# Patient Record
Sex: FEMALE | Race: WHITE | NOT HISPANIC OR LATINO | Employment: OTHER | URBAN - METROPOLITAN AREA
[De-identification: names, ages, dates, MRNs, and addresses within clinical notes are randomized per-mention and may not be internally consistent; named-entity substitution may affect disease eponyms.]

---

## 2017-04-10 ENCOUNTER — HOSPITAL ENCOUNTER (OUTPATIENT)
Dept: FAMILY MEDICINE CLINIC | Facility: CLINIC | Age: 72
Setting detail: SPECIMEN
Discharge: HOME | End: 2017-04-10
Attending: NURSE PRACTITIONER | Admitting: NURSE PRACTITIONER

## 2017-04-10 LAB
ANION GAP SERPL CALC-SCNC: 14.6 MMOL/L (ref 10–20)
BUN SERPL-MCNC: 22 MG/DL (ref 8–20)
BUN/CREAT SERPL: 31.4 (ref 5.4–26.2)
CALCIUM SERPL-MCNC: 9.3 MG/DL (ref 8.9–10.3)
CHLORIDE SERPL-SCNC: 106 MMOL/L (ref 101–111)
CONV CO2: 26 MMOL/L (ref 22–32)
CREAT UR-MCNC: 0.7 MG/DL (ref 0.4–1)
GLUCOSE SERPL-MCNC: 102 MG/DL (ref 65–99)
POTASSIUM SERPL-SCNC: 4.6 MMOL/L (ref 3.6–5.1)
SODIUM SERPL-SCNC: 142 MMOL/L (ref 136–144)

## 2017-04-15 ENCOUNTER — CONVERSION ENCOUNTER (OUTPATIENT)
Dept: FAMILY MEDICINE CLINIC | Facility: CLINIC | Age: 72
End: 2017-04-15

## 2017-04-15 LAB
C3 SERPL-MCNC: 146 MG/DL (ref 90–180)
C4 SERPL-MCNC: 28 MG/DL (ref 16–47)
CONV TOTAL PROTEIN: 6.5 G/DL (ref 6.1–8.1)
CRP SERPL-MCNC: NORMAL MG/DL
CYCLIC CITRULLINATED PEPTIDE ANTIBODY: NORMAL
ERYTHROCYTE [DISTWIDTH] IN BLOOD BY AUTOMATED COUNT: 13.9 % (ref 11–15)
ERYTHROCYTE [SEDIMENTATION RATE] IN BLOOD BY WESTERGREN METHOD: 9 MM/HR
HCT VFR BLD AUTO: 39.9 % (ref 35–45)
HGB BLD-MCNC: 13.3 G/DL (ref 11.7–15.5)
MCH RBC QN AUTO: 31.5 PG (ref 27–33)
MCHC RBC AUTO-ENTMCNC: NORMAL % (ref 32–36)
MCV RBC AUTO: 94.8 FL (ref 80–100)
PLATELET # BLD AUTO: NORMAL 10*3/MM3 (ref 140–400)
PMV BLD AUTO: 8.3 FL (ref 7.5–12.5)
RBC # BLD AUTO: NORMAL 10*6/MM3 (ref 3.8–5.1)
WBC # BLD AUTO: NORMAL K/UL (ref 3.8–10.8)

## 2017-04-27 ENCOUNTER — HOSPITAL ENCOUNTER (OUTPATIENT)
Dept: MAMMOGRAPHY | Facility: HOSPITAL | Age: 72
Discharge: HOME | End: 2017-04-27
Attending: INTERNAL MEDICINE | Admitting: INTERNAL MEDICINE

## 2017-10-10 ENCOUNTER — HOSPITAL ENCOUNTER (OUTPATIENT)
Dept: FAMILY MEDICINE CLINIC | Facility: CLINIC | Age: 72
Setting detail: SPECIMEN
Discharge: HOME | End: 2017-10-10
Attending: NURSE PRACTITIONER | Admitting: NURSE PRACTITIONER

## 2017-10-10 LAB
ALBUMIN SERPL-MCNC: 3.8 G/DL (ref 3.5–4.8)
ALBUMIN/GLOB SERPL: 1.7 {RATIO} (ref 1–1.7)
ALP SERPL-CCNC: 47 IU/L (ref 32–91)
ALT SERPL-CCNC: 26 IU/L (ref 14–54)
ANION GAP SERPL CALC-SCNC: 12 MMOL/L (ref 10–20)
AST SERPL-CCNC: 23 IU/L (ref 15–41)
BILIRUB SERPL-MCNC: 0.7 MG/DL (ref 0.3–1.2)
BUN SERPL-MCNC: 23 MG/DL (ref 8–20)
BUN/CREAT SERPL: 32.9 (ref 5.4–26.2)
CALCIUM SERPL-MCNC: 9.3 MG/DL (ref 8.9–10.3)
CHLORIDE SERPL-SCNC: 103 MMOL/L (ref 101–111)
CONV CO2: 27 MMOL/L (ref 22–32)
CONV TOTAL PROTEIN: 6.1 G/DL (ref 6.1–7.9)
CREAT UR-MCNC: 0.7 MG/DL (ref 0.4–1)
GLOBULIN UR ELPH-MCNC: 2.3 G/DL (ref 2.5–3.8)
GLUCOSE SERPL-MCNC: 106 MG/DL (ref 65–99)
POTASSIUM SERPL-SCNC: 4 MMOL/L (ref 3.6–5.1)
SODIUM SERPL-SCNC: 138 MMOL/L (ref 136–144)

## 2017-11-09 ENCOUNTER — HOSPITAL ENCOUNTER (OUTPATIENT)
Dept: MAMMOGRAPHY | Facility: HOSPITAL | Age: 72
Discharge: HOME | End: 2017-11-09
Attending: NURSE PRACTITIONER | Admitting: NURSE PRACTITIONER

## 2018-05-03 ENCOUNTER — HOSPITAL ENCOUNTER (OUTPATIENT)
Dept: LAB | Facility: HOSPITAL | Age: 73
Discharge: HOME | End: 2018-05-03
Attending: NURSE PRACTITIONER | Admitting: NURSE PRACTITIONER

## 2018-05-03 ENCOUNTER — HOSPITAL ENCOUNTER (OUTPATIENT)
Dept: RHEUMATOLOGY | Facility: CLINIC | Age: 73
Discharge: HOME | End: 2018-05-03
Attending: NURSE PRACTITIONER | Admitting: NURSE PRACTITIONER

## 2018-05-03 LAB
ALBUMIN SERPL-MCNC: 3.8 G/DL (ref 3.5–4.8)
ALBUMIN/GLOB SERPL: 1.5 {RATIO} (ref 1–1.7)
ALP SERPL-CCNC: 43 IU/L (ref 32–91)
ALT SERPL-CCNC: 22 IU/L (ref 14–54)
ANION GAP SERPL CALC-SCNC: 10.7 MMOL/L (ref 10–20)
AST SERPL-CCNC: 19 IU/L (ref 15–41)
BILIRUB SERPL-MCNC: 0.6 MG/DL (ref 0.3–1.2)
BUN SERPL-MCNC: 22 MG/DL (ref 8–20)
BUN/CREAT SERPL: 31.4 (ref 5.4–26.2)
CALCIUM SERPL-MCNC: 9.1 MG/DL (ref 8.9–10.3)
CHLORIDE SERPL-SCNC: 108 MMOL/L (ref 101–111)
CONV CO2: 26 MMOL/L (ref 22–32)
CONV TOTAL PROTEIN: 6.4 G/DL (ref 6.1–7.9)
CREAT UR-MCNC: 0.7 MG/DL (ref 0.4–1)
CRP SERPL-MCNC: 0.03 MG/DL (ref 0–0.7)
ERYTHROCYTE [DISTWIDTH] IN BLOOD BY AUTOMATED COUNT: 13.1 % (ref 11.5–14.5)
ERYTHROCYTE [SEDIMENTATION RATE] IN BLOOD BY WESTERGREN METHOD: 16 MM/HR (ref 0–30)
GLOBULIN UR ELPH-MCNC: 2.6 G/DL (ref 2.5–3.8)
GLUCOSE SERPL-MCNC: 96 MG/DL (ref 65–99)
HCT VFR BLD AUTO: 39.4 % (ref 35–49)
HGB BLD-MCNC: 13.3 G/DL (ref 12–15)
MCH RBC QN AUTO: 32.1 PG (ref 26–32)
MCHC RBC AUTO-ENTMCNC: 33.7 G/DL (ref 32–36)
MCV RBC AUTO: 95.3 FL (ref 80–94)
PLATELET # BLD AUTO: 252 10*3/UL (ref 150–450)
PMV BLD AUTO: 8.1 FL (ref 7.4–10.4)
POTASSIUM SERPL-SCNC: 3.7 MMOL/L (ref 3.6–5.1)
RBC # BLD AUTO: 4.14 10*6/UL (ref 4–5.4)
SODIUM SERPL-SCNC: 141 MMOL/L (ref 136–144)
WBC # BLD AUTO: 5.1 10*3/UL (ref 4.5–11.5)

## 2018-05-04 LAB
ANA SER QL IA: NORMAL
CCP IGG ANTIBODIES: <0.4 U/ML
CHROMATIN AB SERPL-ACNC: <20 [IU]/ML (ref 0–20)

## 2018-10-04 ENCOUNTER — HOSPITAL ENCOUNTER (OUTPATIENT)
Dept: FAMILY MEDICINE CLINIC | Facility: CLINIC | Age: 73
Setting detail: SPECIMEN
Discharge: HOME | End: 2018-10-04
Attending: PHYSICIAN ASSISTANT | Admitting: PHYSICIAN ASSISTANT

## 2018-10-04 LAB
ALBUMIN SERPL-MCNC: 3.8 G/DL (ref 3.5–4.8)
ALBUMIN/GLOB SERPL: 1.4 {RATIO} (ref 1–1.7)
ALP SERPL-CCNC: 51 IU/L (ref 32–91)
ALT SERPL-CCNC: 29 IU/L (ref 14–54)
ANION GAP SERPL CALC-SCNC: 11.4 MMOL/L (ref 10–20)
AST SERPL-CCNC: 23 IU/L (ref 15–41)
BILIRUB SERPL-MCNC: 1.1 MG/DL (ref 0.3–1.2)
BUN SERPL-MCNC: 22 MG/DL (ref 8–20)
BUN/CREAT SERPL: 31.4 (ref 5.4–26.2)
CALCIUM SERPL-MCNC: 9.7 MG/DL (ref 8.9–10.3)
CHLORIDE SERPL-SCNC: 107 MMOL/L (ref 101–111)
CHOLEST SERPL-MCNC: 133 MG/DL
CHOLEST/HDLC SERPL: 2.7 {RATIO}
CONV CO2: 26 MMOL/L (ref 22–32)
CONV LDL CHOLESTEROL DIRECT: 71 MG/DL (ref 0–100)
CONV MICROALBUM.,U,RANDOM: 9 MG/L
CONV TOTAL PROTEIN: 6.5 G/DL (ref 6.1–7.9)
CREAT 24H UR-MCNC: 149.1 MG/DL
CREAT UR-MCNC: 0.7 MG/DL (ref 0.4–1)
GLOBULIN UR ELPH-MCNC: 2.7 G/DL (ref 2.5–3.8)
GLUCOSE SERPL-MCNC: 112 MG/DL (ref 65–99)
HBA1C MFR BLD: 5.5 % (ref 0–5.6)
HDLC SERPL-MCNC: 49 MG/DL
LDLC/HDLC SERPL: 1.5 {RATIO}
LIPID INTERPRETATION: ABNORMAL
MICROALBUMIN/CREAT UR: 6 UG/MG
POTASSIUM SERPL-SCNC: 4.4 MMOL/L (ref 3.6–5.1)
SODIUM SERPL-SCNC: 140 MMOL/L (ref 136–144)
TRIGL SERPL-MCNC: 161 MG/DL
VLDLC SERPL CALC-MCNC: 13.5 MG/DL

## 2018-11-12 ENCOUNTER — HOSPITAL ENCOUNTER (OUTPATIENT)
Dept: MAMMOGRAPHY | Facility: HOSPITAL | Age: 73
Discharge: HOME | End: 2018-11-12
Attending: PHYSICIAN ASSISTANT | Admitting: PHYSICIAN ASSISTANT

## 2019-05-01 ENCOUNTER — CONVERSION ENCOUNTER (OUTPATIENT)
Dept: FAMILY MEDICINE CLINIC | Facility: CLINIC | Age: 74
End: 2019-05-01

## 2019-05-01 ENCOUNTER — HOSPITAL ENCOUNTER (OUTPATIENT)
Dept: LAB | Facility: HOSPITAL | Age: 74
Discharge: HOME | End: 2019-05-01
Attending: NURSE PRACTITIONER | Admitting: NURSE PRACTITIONER

## 2019-05-07 ENCOUNTER — HOSPITAL ENCOUNTER (OUTPATIENT)
Dept: MAMMOGRAPHY | Facility: HOSPITAL | Age: 74
Discharge: HOME | End: 2019-05-07
Attending: PHYSICIAN ASSISTANT | Admitting: PHYSICIAN ASSISTANT

## 2019-06-03 VITALS
BODY MASS INDEX: 43.02 KG/M2 | DIASTOLIC BLOOD PRESSURE: 78 MMHG | WEIGHT: 252 LBS | HEART RATE: 79 BPM | SYSTOLIC BLOOD PRESSURE: 116 MMHG | HEIGHT: 64 IN

## 2019-09-17 RX ORDER — METFORMIN HYDROCHLORIDE 500 MG/1
TABLET, EXTENDED RELEASE ORAL
Qty: 180 TABLET | Refills: 1 | Status: SHIPPED | OUTPATIENT
Start: 2019-09-17 | End: 2020-03-09

## 2019-10-22 ENCOUNTER — TELEPHONE (OUTPATIENT)
Dept: FAMILY MEDICINE CLINIC | Facility: CLINIC | Age: 74
End: 2019-10-22

## 2019-10-22 DIAGNOSIS — I10 ESSENTIAL HYPERTENSION: Primary | ICD-10-CM

## 2019-10-22 DIAGNOSIS — E78.5 HYPERLIPIDEMIA, UNSPECIFIED HYPERLIPIDEMIA TYPE: ICD-10-CM

## 2019-10-22 DIAGNOSIS — E03.9 HYPOTHYROIDISM, UNSPECIFIED TYPE: ICD-10-CM

## 2019-10-22 DIAGNOSIS — E11.69 TYPE 2 DIABETES MELLITUS WITH OTHER SPECIFIED COMPLICATION, UNSPECIFIED WHETHER LONG TERM INSULIN USE (HCC): ICD-10-CM

## 2019-10-25 LAB
ALBUMIN SERPL-MCNC: 4.3 G/DL (ref 3.5–5.2)
ALBUMIN UR-MCNC: <1.2 MG/DL
ALBUMIN/GLOB SERPL: 1.7 G/DL
ALP SERPL-CCNC: 51 U/L (ref 39–117)
ALT SERPL W P-5'-P-CCNC: 26 U/L (ref 1–33)
ANION GAP SERPL CALCULATED.3IONS-SCNC: 9.1 MMOL/L (ref 5–15)
AST SERPL-CCNC: 19 U/L (ref 1–32)
BILIRUB SERPL-MCNC: 0.6 MG/DL (ref 0.2–1.2)
BUN BLD-MCNC: 18 MG/DL (ref 8–23)
BUN/CREAT SERPL: 26.1 (ref 7–25)
CALCIUM SPEC-SCNC: 9.4 MG/DL (ref 8.6–10.5)
CHLORIDE SERPL-SCNC: 104 MMOL/L (ref 98–107)
CHOLEST SERPL-MCNC: 118 MG/DL (ref 0–200)
CO2 SERPL-SCNC: 26.9 MMOL/L (ref 22–29)
CREAT BLD-MCNC: 0.69 MG/DL (ref 0.57–1)
CREAT UR-MCNC: 94 MG/DL
GFR SERPL CREATININE-BSD FRML MDRD: 84 ML/MIN/1.73
GLOBULIN UR ELPH-MCNC: 2.6 GM/DL
GLUCOSE BLD-MCNC: 113 MG/DL (ref 65–99)
HBA1C MFR BLD: 5.5 % (ref 3.5–5.6)
HDLC SERPL-MCNC: 47 MG/DL (ref 40–60)
LDLC SERPL CALC-MCNC: 40 MG/DL (ref 0–100)
LDLC/HDLC SERPL: 0.86 {RATIO}
MICROALBUMIN/CREAT UR: NORMAL MG/G{CREAT}
POTASSIUM BLD-SCNC: 4.4 MMOL/L (ref 3.5–5.2)
PROT SERPL-MCNC: 6.9 G/DL (ref 6–8.5)
SODIUM BLD-SCNC: 140 MMOL/L (ref 136–145)
TRIGL SERPL-MCNC: 153 MG/DL (ref 0–150)
TSH SERPL DL<=0.05 MIU/L-ACNC: 1.59 UIU/ML (ref 0.27–4.2)
VLDLC SERPL-MCNC: 30.6 MG/DL (ref 5–40)

## 2019-10-25 PROCEDURE — 80053 COMPREHEN METABOLIC PANEL: CPT | Performed by: PHYSICIAN ASSISTANT

## 2019-10-25 PROCEDURE — 82570 ASSAY OF URINE CREATININE: CPT | Performed by: PHYSICIAN ASSISTANT

## 2019-10-25 PROCEDURE — 80061 LIPID PANEL: CPT | Performed by: PHYSICIAN ASSISTANT

## 2019-10-25 PROCEDURE — 82043 UR ALBUMIN QUANTITATIVE: CPT | Performed by: PHYSICIAN ASSISTANT

## 2019-10-25 PROCEDURE — 84443 ASSAY THYROID STIM HORMONE: CPT | Performed by: PHYSICIAN ASSISTANT

## 2019-10-25 PROCEDURE — 83036 HEMOGLOBIN GLYCOSYLATED A1C: CPT | Performed by: PHYSICIAN ASSISTANT

## 2019-10-25 PROCEDURE — 36415 COLL VENOUS BLD VENIPUNCTURE: CPT | Performed by: PHYSICIAN ASSISTANT

## 2019-10-28 ENCOUNTER — OFFICE VISIT (OUTPATIENT)
Dept: RHEUMATOLOGY | Facility: CLINIC | Age: 74
End: 2019-10-28

## 2019-10-28 VITALS
SYSTOLIC BLOOD PRESSURE: 124 MMHG | HEIGHT: 62 IN | BODY MASS INDEX: 46.38 KG/M2 | HEART RATE: 73 BPM | DIASTOLIC BLOOD PRESSURE: 70 MMHG | WEIGHT: 252 LBS

## 2019-10-28 DIAGNOSIS — Z23 NEED FOR INFLUENZA VACCINATION: Primary | ICD-10-CM

## 2019-10-28 DIAGNOSIS — M70.61 TROCHANTERIC BURSITIS OF RIGHT HIP: ICD-10-CM

## 2019-10-28 DIAGNOSIS — M19.90 INFLAMMATORY ARTHRITIS: ICD-10-CM

## 2019-10-28 DIAGNOSIS — R76.8 ELEVATED ANTINUCLEAR ANTIBODY (ANA) LEVEL: ICD-10-CM

## 2019-10-28 PROBLEM — M70.62 TROCHANTERIC BURSITIS OF LEFT HIP: Status: ACTIVE | Noted: 2019-05-01

## 2019-10-28 PROBLEM — Z95.0 PRESENCE OF CARDIAC PACEMAKER: Status: ACTIVE | Noted: 2019-10-28

## 2019-10-28 PROBLEM — I50.9 CONGESTIVE HEART FAILURE (HCC): Status: ACTIVE | Noted: 2019-10-28

## 2019-10-28 PROBLEM — E78.5 HYPERLIPIDEMIA: Status: ACTIVE | Noted: 2019-10-28

## 2019-10-28 PROBLEM — G47.30 SLEEP APNEA: Status: ACTIVE | Noted: 2019-10-28

## 2019-10-28 PROBLEM — I10 HYPERTENSION: Status: ACTIVE | Noted: 2019-10-28

## 2019-10-28 PROBLEM — Z78.0 POSTMENOPAUSAL: Status: ACTIVE | Noted: 2019-05-02

## 2019-10-28 PROCEDURE — G0008 ADMIN INFLUENZA VIRUS VAC: HCPCS | Performed by: NURSE PRACTITIONER

## 2019-10-28 PROCEDURE — 20610 DRAIN/INJ JOINT/BURSA W/O US: CPT | Performed by: NURSE PRACTITIONER

## 2019-10-28 PROCEDURE — 99214 OFFICE O/P EST MOD 30 MIN: CPT | Performed by: NURSE PRACTITIONER

## 2019-10-28 PROCEDURE — 90653 IIV ADJUVANT VACCINE IM: CPT | Performed by: NURSE PRACTITIONER

## 2019-10-28 RX ORDER — METOPROLOL TARTRATE 50 MG/1
TABLET, FILM COATED ORAL
Refills: 0 | COMMUNITY
Start: 2019-09-16

## 2019-10-28 RX ORDER — FUROSEMIDE 20 MG/1
TABLET ORAL
COMMUNITY
Start: 2012-12-17

## 2019-10-28 RX ORDER — LATANOPROST 50 UG/ML
SOLUTION/ DROPS OPHTHALMIC
COMMUNITY
Start: 2019-05-01 | End: 2019-10-29

## 2019-10-28 RX ORDER — ASPIRIN 325 MG
TABLET ORAL
COMMUNITY
Start: 2012-12-17 | End: 2022-06-15

## 2019-10-28 RX ORDER — LATANOPROSTENE BUNOD 0.24 MG/ML
1 SOLUTION/ DROPS OPHTHALMIC
Refills: 1 | COMMUNITY
Start: 2019-10-04

## 2019-10-28 RX ORDER — LOSARTAN POTASSIUM 50 MG/1
50 TABLET ORAL 2 TIMES DAILY
Refills: 0 | COMMUNITY
Start: 2019-08-20 | End: 2020-12-18

## 2019-10-28 RX ORDER — HYDROCODONE BITARTRATE AND ACETAMINOPHEN 5; 325 MG/1; MG/1
TABLET ORAL EVERY 8 HOURS
COMMUNITY
Start: 2014-10-13 | End: 2019-10-29 | Stop reason: SDUPTHER

## 2019-10-28 RX ORDER — METHYLPREDNISOLONE ACETATE 40 MG/ML
40 INJECTION, SUSPENSION INTRA-ARTICULAR; INTRALESIONAL; INTRAMUSCULAR; SOFT TISSUE ONCE
Status: COMPLETED | OUTPATIENT
Start: 2019-10-28 | End: 2019-10-28

## 2019-10-28 RX ORDER — LANCETS 28 GAUGE
EACH MISCELLANEOUS
COMMUNITY
Start: 2013-10-15

## 2019-10-28 RX ORDER — DORZOLAMIDE HCL 20 MG/ML
SOLUTION/ DROPS OPHTHALMIC
COMMUNITY
Start: 2019-05-01 | End: 2020-12-18

## 2019-10-28 RX ORDER — ATORVASTATIN CALCIUM 40 MG/1
40 TABLET, FILM COATED ORAL DAILY
Refills: 0 | COMMUNITY
Start: 2019-08-21

## 2019-10-28 RX ORDER — PHENOL 1.4 %
AEROSOL, SPRAY (ML) MUCOUS MEMBRANE
COMMUNITY
Start: 2012-12-17 | End: 2019-10-29

## 2019-10-28 RX ORDER — CLOPIDOGREL BISULFATE 75 MG/1
75 TABLET ORAL DAILY
Refills: 0 | COMMUNITY
Start: 2019-08-20

## 2019-10-28 RX ORDER — BLOOD-GLUCOSE METER
KIT MISCELLANEOUS
COMMUNITY
Start: 2018-10-04

## 2019-10-28 RX ORDER — HYDROXYCHLOROQUINE SULFATE 200 MG/1
TABLET, FILM COATED ORAL
COMMUNITY
Start: 2019-01-01 | End: 2019-12-06 | Stop reason: SDUPTHER

## 2019-10-28 RX ADMIN — METHYLPREDNISOLONE ACETATE 40 MG: 40 INJECTION, SUSPENSION INTRA-ARTICULAR; INTRALESIONAL; INTRAMUSCULAR; SOFT TISSUE at 11:23

## 2019-10-28 NOTE — PROGRESS NOTES
Subjective   Seema Silveira is a 74 y.o. female.     History of Present Illness     Pt is a pleasant 74 y.o. female pt with positive RENAE, inflammatory arthritis, COPD, sleep apnea, diabetes. She is here for follow up today. Last seen in 5-1-19.  Had recent labs with PCP that showed A1c 5.5, normal creatinine.   She is taking plaquenil  2 tabs/d. Up to date on eye examination. She takes tylenol as needed, she takes hydrocodone for pain (when the pain is really bad). Feeling ok.    Am stiffness lasts 1 hour. her low back aches, does stretches. Seen spine in the past, and  PM for injections in the low back....did not help much. She was going to have RFA but she cannot have that due to her pacemaker.   Right hip aches, hurts to the touch. cannot tolerate lying on the hips. She did have injection to her left hip at her last office visit that did help her.  Would like rt hip injected today.    Review of systems:  Denies fever/chills. No nasal or oral lesons. Dry eyes & mouth- she claims this lidia worse at night w/ her CPAP.  + COPD and will have SOA intermittently; mostly w/ activity. energy level is  up and down.  Denies dysphagia or raynauds.   No history of inflammatory bowel disease or uveitis.  Has sleep apnea and wears CPAP. She is followed by cardiology & electrophysiologist for her pacemaker. The remainder of the review of systems reviewed and are (-).   Diabetes- PCP manages. Recent A1c 5.5  Denies history of thyroid disease.     Bone density in 5/2019 showed osteopenia.. Takes daily calcium and vitamin D. Denies any recent falls or fractures.         After risks and benefits explained to the patient, consent obtained. The right hip was cleaned and prepped with alcohol and the area was numbed with topical lidocaine spray and then injected with 40 mg of depo medrol and 1 cc of lidocaine. Pt tolerated well. Band aid applied. Injection given at 1050 by GUICHO Hooks          The following portions of the  patient's history were reviewed and updated as appropriate: allergies, current medications, past family history, past medical history, past social history, past surgical history and problem list.    Review of Systems   Constitutional:        See HPI       Objective   Physical Exam   Constitutional: She is oriented to person, place, and time. She appears well-developed and well-nourished.   HENT:   Head: Normocephalic and atraumatic.   Nose: Nose normal.   Eyes: EOM are normal. Pupils are equal, round, and reactive to light.   Cardiovascular: Normal rate and regular rhythm.   Pulmonary/Chest: Effort normal and breath sounds normal.   Musculoskeletal:   Pt has decreased ROM over the lumbar spine  She has tenderness over the right TB & right knee w/ palpation. Mild crepitus is noted over right knee.  No swelling is noted.     Neurological: She is oriented to person, place, and time.   Skin: Skin is dry. No rash noted.   Psychiatric: She has a normal mood and affect.   Vitals reviewed.        Assessment/Plan   Seema was seen today for joint pain.    Diagnoses and all orders for this visit:    Need for influenza vaccination  -     Fluad Quad >65 years    Elevated antinuclear antibody (RENAE) level  Comments:  Stable on examination. She is taking plaquenil 400 mg/d  Continue medications; add on labs today  Orders:  -     C-reactive Protein; Future  -     Cancel: C3 Complement; Future  -     C4 Complement; Future  -     Urinalysis With Culture If Indicated -; Future  -     Sedimentation Rate; Future  -     Cancel: CBC & Differential; Future  -     C3 Complement; Future  -     CBC & Differential; Future    Trochanteric bursitis of right hip  Comments:  Injection given, see note.  Orders:  -     methylPREDNISolone acetate (DEPO-medrol) injection 40 mg    Inflammatory arthritis  Comments:  Stable w/ current therapy  Labs from PCP reviewed & will add on additional laboratories        Patient Instructions   Doing well, continue  with current medications.  Discussed the importance of eye examination with plaquenil use. Pt verbalizes understanding.   Labs reviewed from PCP & will add on orders  Right TB injection given today; post injection instructions given.  RTO 5-6 months.

## 2019-10-29 ENCOUNTER — LAB (OUTPATIENT)
Dept: LAB | Facility: HOSPITAL | Age: 74
End: 2019-10-29

## 2019-10-29 ENCOUNTER — OFFICE VISIT (OUTPATIENT)
Dept: FAMILY MEDICINE CLINIC | Facility: CLINIC | Age: 74
End: 2019-10-29

## 2019-10-29 VITALS
WEIGHT: 252 LBS | HEIGHT: 62 IN | BODY MASS INDEX: 46.38 KG/M2 | SYSTOLIC BLOOD PRESSURE: 163 MMHG | HEART RATE: 73 BPM | TEMPERATURE: 98.1 F | OXYGEN SATURATION: 98 % | DIASTOLIC BLOOD PRESSURE: 81 MMHG

## 2019-10-29 DIAGNOSIS — M54.50 CHRONIC BILATERAL LOW BACK PAIN WITHOUT SCIATICA: ICD-10-CM

## 2019-10-29 DIAGNOSIS — E78.5 HYPERLIPIDEMIA, UNSPECIFIED HYPERLIPIDEMIA TYPE: ICD-10-CM

## 2019-10-29 DIAGNOSIS — I10 ESSENTIAL HYPERTENSION: ICD-10-CM

## 2019-10-29 DIAGNOSIS — E66.01 OBESITY, MORBID (HCC): ICD-10-CM

## 2019-10-29 DIAGNOSIS — R76.8 ELEVATED ANTINUCLEAR ANTIBODY (ANA) LEVEL: ICD-10-CM

## 2019-10-29 DIAGNOSIS — Z12.11 COLON CANCER SCREENING: ICD-10-CM

## 2019-10-29 DIAGNOSIS — I50.9 CONGESTIVE HEART FAILURE, UNSPECIFIED HF CHRONICITY, UNSPECIFIED HEART FAILURE TYPE (HCC): ICD-10-CM

## 2019-10-29 DIAGNOSIS — I70.213 ATHEROSCLEROSIS OF NATIVE ARTERIES OF EXTREMITIES WITH INTERMITTENT CLAUDICATION, BILATERAL LEGS (HCC): ICD-10-CM

## 2019-10-29 DIAGNOSIS — I25.10 CHRONIC CORONARY ARTERY DISEASE: ICD-10-CM

## 2019-10-29 DIAGNOSIS — Z12.31 VISIT FOR SCREENING MAMMOGRAM: ICD-10-CM

## 2019-10-29 DIAGNOSIS — I48.0 PAROXYSMAL ATRIAL FIBRILLATION (HCC): ICD-10-CM

## 2019-10-29 DIAGNOSIS — M46.1 SACROILIITIS, NOT ELSEWHERE CLASSIFIED (HCC): ICD-10-CM

## 2019-10-29 DIAGNOSIS — M19.90 OSTEOARTHRITIS, UNSPECIFIED OSTEOARTHRITIS TYPE, UNSPECIFIED SITE: ICD-10-CM

## 2019-10-29 DIAGNOSIS — Z12.31 SCREENING MAMMOGRAM, ENCOUNTER FOR: ICD-10-CM

## 2019-10-29 DIAGNOSIS — M85.80 OSTEOPENIA, UNSPECIFIED LOCATION: ICD-10-CM

## 2019-10-29 DIAGNOSIS — H40.9 GLAUCOMA, UNSPECIFIED GLAUCOMA TYPE, UNSPECIFIED LATERALITY: ICD-10-CM

## 2019-10-29 DIAGNOSIS — M19.90 INFLAMMATORY ARTHRITIS: ICD-10-CM

## 2019-10-29 DIAGNOSIS — E11.69 TYPE 2 DIABETES MELLITUS WITH OTHER SPECIFIED COMPLICATION, WITHOUT LONG-TERM CURRENT USE OF INSULIN (HCC): ICD-10-CM

## 2019-10-29 DIAGNOSIS — G89.29 CHRONIC BILATERAL LOW BACK PAIN WITHOUT SCIATICA: ICD-10-CM

## 2019-10-29 DIAGNOSIS — Z11.59 ENCOUNTER FOR HEPATITIS C SCREENING TEST FOR LOW RISK PATIENT: Primary | ICD-10-CM

## 2019-10-29 DIAGNOSIS — G47.31 PRIMARY CENTRAL SLEEP APNEA: ICD-10-CM

## 2019-10-29 DIAGNOSIS — J44.9 CHRONIC OBSTRUCTIVE PULMONARY DISEASE, UNSPECIFIED COPD TYPE (HCC): ICD-10-CM

## 2019-10-29 LAB
BACTERIA UR QL AUTO: ABNORMAL /HPF
BASOPHILS # BLD AUTO: 0.07 10*3/MM3 (ref 0–0.2)
BASOPHILS NFR BLD AUTO: 1.1 % (ref 0–1.5)
BILIRUB UR QL STRIP: NEGATIVE
C3 SERPL-MCNC: 152 MG/DL (ref 82–167)
C4 SERPL-MCNC: 27 MG/DL (ref 14–44)
CLARITY UR: CLEAR
COLOR UR: YELLOW
CRP SERPL-MCNC: 0.06 MG/DL (ref 0–0.5)
DEPRECATED RDW RBC AUTO: 41.6 FL (ref 37–54)
EOSINOPHIL # BLD AUTO: 0.18 10*3/MM3 (ref 0–0.4)
EOSINOPHIL NFR BLD AUTO: 2.9 % (ref 0.3–6.2)
ERYTHROCYTE [DISTWIDTH] IN BLOOD BY AUTOMATED COUNT: 12 % (ref 12.3–15.4)
ERYTHROCYTE [SEDIMENTATION RATE] IN BLOOD: 10 MM/HR (ref 0–30)
GLUCOSE UR STRIP-MCNC: NEGATIVE MG/DL
HCT VFR BLD AUTO: 39.6 % (ref 34–46.6)
HCV AB SER DONR QL: NORMAL
HGB BLD-MCNC: 13.5 G/DL (ref 12–15.9)
HGB UR QL STRIP.AUTO: NEGATIVE
HYALINE CASTS UR QL AUTO: ABNORMAL /LPF
IMM GRANULOCYTES # BLD AUTO: 0.02 10*3/MM3 (ref 0–0.05)
IMM GRANULOCYTES NFR BLD AUTO: 0.3 % (ref 0–0.5)
KETONES UR QL STRIP: NEGATIVE
LEUKOCYTE ESTERASE UR QL STRIP.AUTO: ABNORMAL
LYMPHOCYTES # BLD AUTO: 1.25 10*3/MM3 (ref 0.7–3.1)
LYMPHOCYTES NFR BLD AUTO: 19.8 % (ref 19.6–45.3)
MCH RBC QN AUTO: 32.8 PG (ref 26.6–33)
MCHC RBC AUTO-ENTMCNC: 34.1 G/DL (ref 31.5–35.7)
MCV RBC AUTO: 96.1 FL (ref 79–97)
MONOCYTES # BLD AUTO: 0.81 10*3/MM3 (ref 0.1–0.9)
MONOCYTES NFR BLD AUTO: 12.9 % (ref 5–12)
NEUTROPHILS # BLD AUTO: 3.97 10*3/MM3 (ref 1.7–7)
NEUTROPHILS NFR BLD AUTO: 63 % (ref 42.7–76)
NITRITE UR QL STRIP: POSITIVE
NRBC BLD AUTO-RTO: 0 /100 WBC (ref 0–0.2)
PH UR STRIP.AUTO: 8 [PH] (ref 5–8)
PLATELET # BLD AUTO: 247 10*3/MM3 (ref 140–450)
PMV BLD AUTO: 10.4 FL (ref 6–12)
PROT UR QL STRIP: NEGATIVE
RBC # BLD AUTO: 4.12 10*6/MM3 (ref 3.77–5.28)
RBC # UR: ABNORMAL /HPF
REF LAB TEST METHOD: ABNORMAL
SP GR UR STRIP: 1.03 (ref 1–1.03)
SQUAMOUS #/AREA URNS HPF: ABNORMAL /HPF
UROBILINOGEN UR QL STRIP: ABNORMAL
WBC NRBC COR # BLD: 6.3 10*3/MM3 (ref 3.4–10.8)
WBC UR QL AUTO: ABNORMAL /HPF

## 2019-10-29 PROCEDURE — 99214 OFFICE O/P EST MOD 30 MIN: CPT | Performed by: PHYSICIAN ASSISTANT

## 2019-10-29 PROCEDURE — 86160 COMPLEMENT ANTIGEN: CPT

## 2019-10-29 PROCEDURE — 85025 COMPLETE CBC W/AUTO DIFF WBC: CPT

## 2019-10-29 PROCEDURE — 36415 COLL VENOUS BLD VENIPUNCTURE: CPT

## 2019-10-29 PROCEDURE — 87186 SC STD MICRODIL/AGAR DIL: CPT

## 2019-10-29 PROCEDURE — 81001 URINALYSIS AUTO W/SCOPE: CPT

## 2019-10-29 PROCEDURE — 86803 HEPATITIS C AB TEST: CPT | Performed by: PHYSICIAN ASSISTANT

## 2019-10-29 PROCEDURE — 85652 RBC SED RATE AUTOMATED: CPT

## 2019-10-29 PROCEDURE — 86140 C-REACTIVE PROTEIN: CPT

## 2019-10-29 PROCEDURE — 87086 URINE CULTURE/COLONY COUNT: CPT

## 2019-10-29 RX ORDER — ALBUTEROL SULFATE 90 UG/1
2 AEROSOL, METERED RESPIRATORY (INHALATION) EVERY 4 HOURS PRN
Qty: 1 INHALER | Refills: 5 | Status: SHIPPED | OUTPATIENT
Start: 2019-10-29

## 2019-10-29 RX ORDER — HYDROCODONE BITARTRATE AND ACETAMINOPHEN 5; 325 MG/1; MG/1
1 TABLET ORAL EVERY 8 HOURS PRN
Qty: 30 TABLET | Refills: 0 | Status: SHIPPED | OUTPATIENT
Start: 2019-10-29 | End: 2020-07-10 | Stop reason: SDUPTHER

## 2019-10-29 NOTE — PROGRESS NOTES
Subjective  Seema Silveira is a 74 y.o. female     History of Present Illness  Patient is a 74-year-old white female here for follow-up and maintenance of her current medical conditions which include:    1.  Osteopenia: Patient is currently taking calcium 1200 mg vitamin D 800 IU once daily.    2.  Hypertension: Patient is currently taking Lasix 20 mg once daily, losartan 50 mg twice daily, and metoprolol 50 mg 1-1/2 tablets twice a day.    3.  Hyperlipidemia: Patient is currently taking atorvastatin 40 mg daily.    4.  Coronary artery disease/atrial fibrillation: Patient is currently taking Plavix 75 mg once daily, aspirin 325 mg daily.  She has a pacemaker.    5.  Chronic arthritis/low back pain: Patient is currently taking hydrocodone 5 mg as needed, she feels a prescription for 30 pills approximately once every 6 months.    6.  Diabetes mellitus type 2: Patient is currently taking metformin extended release 500 mg 2 tablets in the morning.    7.  Glaucoma: Patient is currently taking Vyzulta drops and dorzolamide drops.  She is seeing Nikhil Gurrola for this.    8.  RENAE positive: Patient is currently taking Plaquenil 200 mg twice daily, she sees rheumatology for this.    9.  Sleep apnea: Patient is currently using and benefiting from a CPAP machine nightly.    10.  Congestive heart failure: Patient is currently taking Lasix 20 mg once daily.    The following portions of the patient's history were reviewed and updated as appropriate: allergies, current medications, past family history, past medical history, past social history, past surgical history and problem list.    Review of Systems   Constitutional: Negative for fever.   HENT: Negative for sore throat.    Eyes: Negative for blurred vision.   Respiratory: Negative for cough and shortness of breath.    Cardiovascular: Negative for chest pain.   Gastrointestinal: Negative for abdominal pain and blood in stool.   Genitourinary: Negative for flank pain and  "urgency.   Musculoskeletal: Negative for joint swelling.   Neurological: Negative for syncope and confusion.   Psychiatric/Behavioral: Negative for suicidal ideas.       Objective  Physical Exam   Constitutional: She is oriented to person, place, and time. She appears well-developed and well-nourished.   HENT:   Head: Normocephalic and atraumatic.   Right Ear: External ear normal.   Left Ear: External ear normal.   Mouth/Throat: Oropharynx is clear and moist.   Eyes: EOM are normal. Pupils are equal, round, and reactive to light.   Neck: Normal range of motion. Neck supple.   Cardiovascular: Normal rate, regular rhythm and normal heart sounds.   Pulmonary/Chest: Effort normal and breath sounds normal.   Abdominal: Soft. Bowel sounds are normal.   Musculoskeletal: Normal range of motion.        Back:    Neurological: She is alert and oriented to person, place, and time.   Skin: Skin is warm and dry.   Psychiatric: She has a normal mood and affect. Her behavior is normal.       Vitals:    10/29/19 0950   BP: 163/81   BP Location: Right arm   Patient Position: Sitting   Cuff Size: Adult   Pulse: 73   Temp: 98.1 °F (36.7 °C)   TempSrc: Oral   SpO2: 98%   Weight: 114 kg (252 lb)   Height: 157.5 cm (62\")     Body mass index is 46.09 kg/m².    PHQ-9 Total Score: 10      Assessment/Plan  Diagnoses and all orders for this visit:    1. Encounter for hepatitis C screening test for low risk patient (Primary)  Comments:  Hepatitis C screening due to patient's age.  Orders:  -     Hepatitis C antibody    2. Screening mammogram, encounter for  Comments:  Mammogram ordered  Orders:  -     Mammo Screening Digital Tomosynthesis Bilateral With CAD    3. Colon cancer screening  Comments:  Colonoscopy ordered.  Orders:  -     Ambulatory Referral For Screening Colonoscopy    4. Type 2 diabetes mellitus with other specified complication, without long-term current use of insulin (CMS/HCC)  Comments:  Stable, patient to continue current " medications.  Recent A1c 5.5.    5. Chronic obstructive pulmonary disease, unspecified COPD type (CMS/[facility])  Comments:  Starting patient on albuterol inhaler as needed and Anoro daily.    6. Primary central sleep apnea  Comments:  Patient to continue using CPAP machine.    7. Osteopenia, unspecified location  Comments:  Patient to continue with calcium and vitamin D.  She is to add on weightbearing exercises.    8. Inflammatory arthritis  Comments:  Stable, patient to continue current medications.    9. Osteoarthritis, unspecified osteoarthritis type, unspecified site  Comments:  Stable, patient to continue current medications.    10. Visit for screening mammogram  Comments:  Mammogram ordered.    11. Glaucoma, unspecified glaucoma type, unspecified laterality  Comments:  Stable, patient to continue current medications.    12. Chronic bilateral low back pain without sciatica  Comments:  I refilled the toe.  INSPECT run and reviewed.  Pain scale collected.  Narcotics contract collected.    13. Paroxysmal atrial fibrillation (CMS/[facility])  Comments:  Stable, patient to continue current medications.    14. Essential hypertension  Comments:  Stable, patient to continue current medications.  Patient reports her blood pressure is normal at home.    15. Hyperlipidemia, unspecified hyperlipidemia type  Comments:  Stable, patient to continue current medications.    16. Congestive heart failure, unspecified HF chronicity, unspecified heart failure type (CMS/[facility])  Comments:  Stable, patient to continue current medications.    17. Chronic coronary artery disease  Comments:  Stable, patient to continue current medications.    18. Sacroiliitis, not elsewhere classified (CMS/[facility])  Comments:  Stable, patient to continue current medications.    19. Obesity, morbid (CMS/[facility])  Comments:  I encouraged regular walking to help lose weight.    20. Atherosclerosis of native arteries of extremities with intermittent claudication, bilateral legs  (CMS/[facility])  Comments:  I encouraged walking and weight loss.    Other orders  -     Calcium Carb-Cholecalciferol (CALCIUM-VITAMIN D) 600-400 MG-UNIT tablet; Take 2 tablets by mouth Daily.  Dispense: 180 tablet; Refill: 1  -     HYDROcodone-acetaminophen (NORCO) 5-325 MG per tablet; Take 1 tablet by mouth Every 8 (Eight) Hours As Needed for Moderate Pain  or Severe Pain .  Dispense: 30 tablet; Refill: 0  -     albuterol sulfate  (90 Base) MCG/ACT inhaler; Inhale 2 puffs Every 4 (Four) Hours As Needed for Wheezing.  Dispense: 1 inhaler; Refill: 5  -     umeclidinium-vilanterol (ANORO ELLIPTA) 62.5-25 MCG/INH aerosol powder  inhaler; Inhale 1 puff Daily.  Dispense: 1 each; Refill: 5

## 2019-10-31 LAB — BACTERIA SPEC AEROBE CULT: ABNORMAL

## 2019-11-04 ENCOUNTER — TELEPHONE (OUTPATIENT)
Dept: RHEUMATOLOGY | Facility: CLINIC | Age: 74
End: 2019-11-04

## 2019-11-04 RX ORDER — NITROFURANTOIN 25; 75 MG/1; MG/1
100 CAPSULE ORAL 2 TIMES DAILY
Qty: 10 CAPSULE | Refills: 0 | Status: SHIPPED | OUTPATIENT
Start: 2019-11-04 | End: 2020-07-10

## 2019-11-04 NOTE — TELEPHONE ENCOUNTER
----- Message from DAVID Guzmán sent at 11/3/2019  8:49 PM EST -----  CRP,ESR, complements normal. Her urine culture came back (+) for bacterial growth; please send in nitrofurantoin 100 mg ER 1 tab po q12h X 5 days. Qty #10 NR. Push fluids, brien water. Follow up w/ PCP.

## 2019-11-13 ENCOUNTER — OFFICE VISIT (OUTPATIENT)
Dept: FAMILY MEDICINE CLINIC | Facility: CLINIC | Age: 74
End: 2019-11-13

## 2019-11-13 VITALS
SYSTOLIC BLOOD PRESSURE: 152 MMHG | DIASTOLIC BLOOD PRESSURE: 75 MMHG | OXYGEN SATURATION: 98 % | TEMPERATURE: 98 F | HEART RATE: 83 BPM | BODY MASS INDEX: 46.64 KG/M2 | WEIGHT: 255 LBS

## 2019-11-13 DIAGNOSIS — N39.0 URINARY TRACT INFECTION WITHOUT HEMATURIA, SITE UNSPECIFIED: Primary | ICD-10-CM

## 2019-11-13 LAB
BILIRUB BLD-MCNC: NEGATIVE MG/DL
CLARITY, POC: CLEAR
COLOR UR: YELLOW
GLUCOSE UR STRIP-MCNC: NEGATIVE MG/DL
KETONES UR QL: NEGATIVE
LEUKOCYTE EST, POC: ABNORMAL
NITRITE UR-MCNC: NEGATIVE MG/ML
PH UR: 5 [PH] (ref 5–8)
PROT UR STRIP-MCNC: NEGATIVE MG/DL
RBC # UR STRIP: NEGATIVE /UL
SP GR UR: 1.02 (ref 1–1.03)
UROBILINOGEN UR QL: NORMAL

## 2019-11-13 PROCEDURE — 81003 URINALYSIS AUTO W/O SCOPE: CPT | Performed by: NURSE PRACTITIONER

## 2019-11-13 PROCEDURE — 99213 OFFICE O/P EST LOW 20 MIN: CPT | Performed by: NURSE PRACTITIONER

## 2019-11-13 NOTE — PROGRESS NOTES
Subjective   Seema Silveira is a 74 y.o. female.       HPI   Pt. Seen at Rheumatology over a week ago; urine culture showed bacteria growth.  She was started on Macrobid and completed this 2 days ago.  She was told she should follow up with her PCP to make sure it had resolved.  She denies any abdominal pain, pressure or burning with urination.  No fevers.  No blood seen in urine.  She has chronic back pain from arthritis but this has not worsened.      The following portions of the patient's history were reviewed and updated as appropriate: allergies, current medications, past family history, past medical history, past social history, past surgical history and problem list.    Review of Systems   Constitutional: Negative for activity change, appetite change, chills, diaphoresis, fatigue, fever, unexpected weight gain and unexpected weight loss.   Respiratory: Negative for shortness of breath and wheezing.    Cardiovascular: Negative for chest pain and palpitations.   Gastrointestinal: Negative for abdominal pain, constipation, diarrhea, nausea and vomiting.   Genitourinary: Negative for decreased urine volume, difficulty urinating, dysuria, flank pain, frequency, hematuria, pelvic pain, pelvic pressure and urgency.   Psychiatric/Behavioral: Negative for depressed mood. The patient is not nervous/anxious.        Objective   Physical Exam   Constitutional: She appears well-developed and well-nourished. No distress.   Cardiovascular: Normal rate, regular rhythm and normal heart sounds.   No murmur heard.  Pulmonary/Chest: Effort normal and breath sounds normal. No respiratory distress.   Abdominal: Soft. Bowel sounds are normal. She exhibits no distension. There is no tenderness.   Psychiatric: She has a normal mood and affect.   Vitals reviewed.        Assessment/Plan   Diagnoses and all orders for this visit:    Urinary tract infection without hematuria, site unspecified  Comments:  Resolved.  UA today is  normal.     Abx. completed.    Call for any concerns.

## 2019-11-14 ENCOUNTER — HOSPITAL ENCOUNTER (OUTPATIENT)
Dept: MAMMOGRAPHY | Facility: HOSPITAL | Age: 74
Discharge: HOME | End: 2019-11-14
Admitting: PHYSICIAN ASSISTANT

## 2019-11-14 PROCEDURE — 77063 BREAST TOMOSYNTHESIS BI: CPT

## 2019-11-14 PROCEDURE — 77067 SCR MAMMO BI INCL CAD: CPT

## 2019-12-09 RX ORDER — HYDROXYCHLOROQUINE SULFATE 200 MG/1
TABLET, FILM COATED ORAL
Qty: 180 TABLET | Refills: 1 | Status: SHIPPED | OUTPATIENT
Start: 2019-12-09 | End: 2020-10-13 | Stop reason: SDUPTHER

## 2020-03-09 RX ORDER — METFORMIN HYDROCHLORIDE 500 MG/1
TABLET, EXTENDED RELEASE ORAL
Qty: 180 TABLET | Refills: 1 | Status: SHIPPED | OUTPATIENT
Start: 2020-03-09 | End: 2020-09-08

## 2020-06-30 ENCOUNTER — TELEPHONE (OUTPATIENT)
Dept: FAMILY MEDICINE CLINIC | Facility: CLINIC | Age: 75
End: 2020-06-30

## 2020-06-30 DIAGNOSIS — I10 ESSENTIAL HYPERTENSION: ICD-10-CM

## 2020-06-30 DIAGNOSIS — E78.2 MIXED HYPERLIPIDEMIA: Primary | ICD-10-CM

## 2020-06-30 DIAGNOSIS — E11.69 TYPE 2 DIABETES MELLITUS WITH OTHER SPECIFIED COMPLICATION, WITHOUT LONG-TERM CURRENT USE OF INSULIN (HCC): ICD-10-CM

## 2020-06-30 NOTE — TELEPHONE ENCOUNTER
Pt has an appt on 7/10/20 pt inquiring about labwork prior to the appt if so pt would like to have it completed.

## 2020-07-06 ENCOUNTER — LAB (OUTPATIENT)
Dept: FAMILY MEDICINE CLINIC | Facility: CLINIC | Age: 75
End: 2020-07-06

## 2020-07-06 LAB
ALBUMIN SERPL-MCNC: 4.1 G/DL (ref 3.5–5.2)
ALBUMIN UR-MCNC: <1.2 MG/DL
ALBUMIN/GLOB SERPL: 1.9 G/DL
ALP SERPL-CCNC: 45 U/L (ref 39–117)
ALT SERPL W P-5'-P-CCNC: 24 U/L (ref 1–33)
ANION GAP SERPL CALCULATED.3IONS-SCNC: 8.8 MMOL/L (ref 5–15)
AST SERPL-CCNC: 20 U/L (ref 1–32)
BILIRUB SERPL-MCNC: 1 MG/DL (ref 0–1.2)
BUN SERPL-MCNC: 20 MG/DL (ref 8–23)
BUN/CREAT SERPL: 26.3 (ref 7–25)
CALCIUM SPEC-SCNC: 9.6 MG/DL (ref 8.6–10.5)
CHLORIDE SERPL-SCNC: 105 MMOL/L (ref 98–107)
CHOLEST SERPL-MCNC: 113 MG/DL (ref 0–200)
CO2 SERPL-SCNC: 27.2 MMOL/L (ref 22–29)
CREAT SERPL-MCNC: 0.76 MG/DL (ref 0.57–1)
CREAT UR-MCNC: 94.6 MG/DL
GFR SERPL CREATININE-BSD FRML MDRD: 75 ML/MIN/1.73
GLOBULIN UR ELPH-MCNC: 2.2 GM/DL
GLUCOSE SERPL-MCNC: 95 MG/DL (ref 65–99)
HBA1C MFR BLD: 5.4 % (ref 3.5–5.6)
HDLC SERPL-MCNC: 51 MG/DL (ref 40–60)
LDLC SERPL CALC-MCNC: 37 MG/DL (ref 0–100)
LDLC/HDLC SERPL: 0.72 {RATIO}
MICROALBUMIN/CREAT UR: NORMAL MG/G{CREAT}
POTASSIUM SERPL-SCNC: 4.4 MMOL/L (ref 3.5–5.2)
PROT SERPL-MCNC: 6.3 G/DL (ref 6–8.5)
SODIUM SERPL-SCNC: 141 MMOL/L (ref 136–145)
TRIGL SERPL-MCNC: 127 MG/DL (ref 0–150)
VLDLC SERPL-MCNC: 25.4 MG/DL (ref 5–40)

## 2020-07-06 PROCEDURE — 82570 ASSAY OF URINE CREATININE: CPT | Performed by: PHYSICIAN ASSISTANT

## 2020-07-06 PROCEDURE — 83036 HEMOGLOBIN GLYCOSYLATED A1C: CPT | Performed by: PHYSICIAN ASSISTANT

## 2020-07-06 PROCEDURE — 80061 LIPID PANEL: CPT | Performed by: PHYSICIAN ASSISTANT

## 2020-07-06 PROCEDURE — 80053 COMPREHEN METABOLIC PANEL: CPT | Performed by: PHYSICIAN ASSISTANT

## 2020-07-06 PROCEDURE — 82043 UR ALBUMIN QUANTITATIVE: CPT | Performed by: PHYSICIAN ASSISTANT

## 2020-07-06 PROCEDURE — 36415 COLL VENOUS BLD VENIPUNCTURE: CPT | Performed by: PHYSICIAN ASSISTANT

## 2020-07-10 ENCOUNTER — OFFICE VISIT (OUTPATIENT)
Dept: FAMILY MEDICINE CLINIC | Facility: CLINIC | Age: 75
End: 2020-07-10

## 2020-07-10 VITALS
OXYGEN SATURATION: 97 % | SYSTOLIC BLOOD PRESSURE: 136 MMHG | BODY MASS INDEX: 44.72 KG/M2 | WEIGHT: 243 LBS | HEART RATE: 77 BPM | HEIGHT: 62 IN | TEMPERATURE: 97.3 F | DIASTOLIC BLOOD PRESSURE: 77 MMHG

## 2020-07-10 DIAGNOSIS — M54.50 CHRONIC BILATERAL LOW BACK PAIN WITHOUT SCIATICA: ICD-10-CM

## 2020-07-10 DIAGNOSIS — Z00.00 MEDICARE ANNUAL WELLNESS VISIT, SUBSEQUENT: Primary | ICD-10-CM

## 2020-07-10 DIAGNOSIS — G89.29 CHRONIC BILATERAL LOW BACK PAIN WITHOUT SCIATICA: ICD-10-CM

## 2020-07-10 PROCEDURE — G0439 PPPS, SUBSEQ VISIT: HCPCS | Performed by: PHYSICIAN ASSISTANT

## 2020-07-10 RX ORDER — RAMIPRIL 10 MG/1
10 CAPSULE ORAL DAILY
COMMUNITY
Start: 2020-06-05

## 2020-07-10 RX ORDER — LORATADINE 10 MG/1
10 TABLET ORAL DAILY
Qty: 90 TABLET | Refills: 1 | Status: SHIPPED | OUTPATIENT
Start: 2020-07-10 | End: 2021-06-22

## 2020-07-10 RX ORDER — HYDROCODONE BITARTRATE AND ACETAMINOPHEN 5; 325 MG/1; MG/1
1 TABLET ORAL EVERY 8 HOURS PRN
Qty: 30 TABLET | Refills: 0 | Status: SHIPPED | OUTPATIENT
Start: 2020-07-10 | End: 2020-12-18 | Stop reason: SDUPTHER

## 2020-07-10 NOTE — PROGRESS NOTES
The ABCs of the Annual Wellness Visit  Subsequent Medicare Wellness Visit    Chief Complaint   Patient presents with   • Medicare Wellness-subsequent       Subjective   History of Present Illness:  Seema Silveira is a 74 y.o. female who presents for a Subsequent Medicare Wellness Visit.    HEALTH RISK ASSESSMENT    Recent Hospitalizations:  No hospitalization(s) within the last year.    Current Medical Providers:  Patient Care Team:  Stephanie Orlando PA as PCP - General  Stephanie Orlando PA as PCP - Family Medicine    Smoking Status:  Social History     Tobacco Use   Smoking Status Never Smoker   Smokeless Tobacco Never Used       Alcohol Consumption:  Social History     Substance and Sexual Activity   Alcohol Use No   • Frequency: Never       Depression Screen:   PHQ-2/PHQ-9 Depression Screening 7/10/2020   Little interest or pleasure in doing things 1   Feeling down, depressed, or hopeless 1   Trouble falling or staying asleep, or sleeping too much 1   Feeling tired or having little energy 1   Poor appetite or overeating 1   Feeling bad about yourself - or that you are a failure or have let yourself or your family down 0   Trouble concentrating on things, such as reading the newspaper or watching television 0   Moving or speaking so slowly that other people could have noticed. Or the opposite - being so fidgety or restless that you have been moving around a lot more than usual 0   Thoughts that you would be better off dead, or of hurting yourself in some way 0   Total Score 5   If you checked off any problems, how difficult have these problems made it for you to do your work, take care of things at home, or get along with other people? Somewhat difficult       Fall Risk Screen:  STEADI Fall Risk Assessment was completed, and patient is at MODERATE risk for falls. Assessment completed on:7/10/2020    Health Habits and Functional and Cognitive Screening:  Functional & Cognitive Status 7/10/2020   Do you have  difficulty preparing food and eating? No   Do you have difficulty bathing yourself, getting dressed or grooming yourself? No   Do you have difficulty using the toilet? No   Do you have difficulty moving around from place to place? No   Do you have trouble with steps or getting out of a bed or a chair? No   Current Diet Well Balanced Diet   Dental Exam Up to date   Eye Exam Up to date   Exercise (times per week) 2 times per week   Current Exercise Activities Include Walking   Do you need help using the phone?  No   Are you deaf or do you have serious difficulty hearing?  Yes   Do you need help with transportation? No   Do you need help shopping? No   Do you need help preparing meals?  No   Do you need help with housework?  No   Do you need help with laundry? No   Do you need help taking your medications? No   Do you need help managing money? No   Do you ever drive or ride in a car without wearing a seat belt? No   Have you felt unusual stress, anger or loneliness in the last month? Yes   Who do you live with? Alone   If you need help, do you have trouble finding someone available to you? No   Have you been bothered in the last four weeks by sexual problems? No   Do you have difficulty concentrating, remembering or making decisions? No         Does the patient have evidence of cognitive impairment? No    Asprin use counseling:Taking ASA appropriately as indicated    Age-appropriate Screening Schedule:  Refer to the list below for future screening recommendations based on patient's age, sex and/or medical conditions. Orders for these recommended tests are listed in the plan section. The patient has been provided with a written plan.    Health Maintenance   Topic Date Due   • TDAP/TD VACCINES (1 - Tdap) 09/30/1956   • ZOSTER VACCINE (1 of 2) 09/30/1997   • DIABETIC FOOT EXAM  09/11/2019   • DIABETIC EYE EXAM  09/11/2019   • COLONOSCOPY  09/11/2019   • INFLUENZA VACCINE  08/01/2020   • HEMOGLOBIN A1C  01/06/2021   • DXA  SCAN  05/11/2021   • LIPID PANEL  07/06/2021   • URINE MICROALBUMIN  07/06/2021   • MAMMOGRAM  11/14/2021          The following portions of the patient's history were reviewed and updated as appropriate: allergies, current medications, past family history, past medical history, past social history, past surgical history and problem list.    Outpatient Medications Prior to Visit   Medication Sig Dispense Refill   • albuterol sulfate  (90 Base) MCG/ACT inhaler Inhale 2 puffs Every 4 (Four) Hours As Needed for Wheezing. 1 inhaler 5   • aspirin 325 MG tablet ASPIRIN 325 MG TABS     • atorvastatin (LIPITOR) 40 MG tablet Take 40 mg by mouth Daily.  0   • Blood Glucose Monitoring Suppl (FREESTYLE FREEDOM LITE) w/Device kit FREESTYLE [place] [employer] LITE w/Device KIT     • Calcium Carb-Cholecalciferol (CALCIUM-VITAMIN D) 600-400 MG-UNIT tablet Take 2 tablets by mouth Daily. 180 tablet 1   • clopidogrel (PLAVIX) 75 MG tablet Take 75 mg by mouth Daily.  0   • dorzolamide (TRUSOPT) 2 % ophthalmic solution DORZOLAMIDE HCL 2 % SOLN     • furosemide (LASIX) 20 MG tablet FUROSEMIDE 20 MG TABS     • hydroxychloroquine (PLAQUENIL) 200 MG tablet TAKE 1 TABLET BY MOUTH TWICE A DAY AFTER MEALS 180 tablet 1   • Lancets (FREESTYLE) lancets FREESTYLE LANCETS     • losartan (COZAAR) 50 MG tablet Take 50 mg by mouth 2 (Two) Times a Day.  0   • metFORMIN ER (GLUCOPHAGE-XR) 500 MG 24 hr tablet TAKE 2 TABLETS BY MOUTH WITH A MEAL EVERY  tablet 1   • metoprolol tartrate (LOPRESSOR) 50 MG tablet TAKE 1 AND 1/2 TABLET BY MOUTH TWICE A DAY AS DIRECTED  0   • ramipril (ALTACE) 10 MG capsule Take 10 mg by mouth Daily.     • umeclidinium-vilanterol (ANORO ELLIPTA) 62.5-25 MCG/INH aerosol powder  inhaler Inhale 1 puff Daily. 1 each 5   • VYZULTA 0.024 % solution Administer 1 drop to both eyes every night at bedtime.  1   • HYDROcodone-acetaminophen (NORCO) 5-325 MG per tablet Take 1 tablet by mouth Every 8 (Eight) Hours As Needed for Moderate  Pain  or Severe Pain . 30 tablet 0   • nitrofurantoin, macrocrystal-monohydrate, (MACROBID) 100 MG capsule Take 1 capsule by mouth 2 (Two) Times a Day. 10 capsule 0     No facility-administered medications prior to visit.        Patient Active Problem List   Diagnosis   • Atherosclerosis of native arteries of extremities with intermittent claudication, bilateral legs (CMS/HCC)   • Chronic coronary artery disease   • Chronic low back pain   • Chronic obstructive pulmonary disease (CMS/HCC)   • Congestive heart failure (CMS/HCC)   • Constipation, chronic   • Degeneration of intervertebral disc of lumbar region   • Elevated antinuclear antibody (RENAE) level   • Elevation of level of transaminase or lactic acid dehydrogenase (LDH)   • Encounter for general adult medical examination without abnormal findings   • Visit for screening mammogram   • Gastroesophageal reflux disease   • Glaucoma   • Hyperlipidemia   • Hypertension   • Lipoma   • Lumbar radiculopathy, right   • Muscle ache   • Need for other prophylactic vaccination and inoculation against single diseases   • Obesity, morbid (CMS/HCC)   • Inflammatory arthritis   • Osteoarthritis   • Osteopenia   • Pain in joint of left shoulder   • Paroxysmal atrial fibrillation (CMS/HCC)   • Postmenopausal   • Presence of cardiac pacemaker   • Restless leg syndrome   • Sacroiliitis, not elsewhere classified (CMS/HCC)   • Sick sinus syndrome (CMS/HCC)   • Sleep apnea   • Spondylosis of lumbosacral region   • Status post percutaneous transluminal coronary angioplasty   • Trochanteric bursitis of left hip   • Type 2 diabetes mellitus (CMS/HCC)       Advanced Care Planning:  ACP discussion was declined by the patient. Patient has an advance directive (not in EMR), copy requested.    Review of Systems   Constitutional: Negative for fatigue and fever.   Respiratory: Negative for shortness of breath.    Cardiovascular: Negative for chest pain.   Gastrointestinal: Negative for  "diarrhea, nausea and vomiting.   Musculoskeletal: Positive for back pain.   Neurological: Negative for dizziness.       Compared to one year ago, the patient feels her physical health is worse.  Compared to one year ago, the patient feels her mental health is worse.    Reviewed chart for potential of high risk medication in the elderly: yes  Reviewed chart for potential of harmful drug interactions in the elderly:yes    Objective         Vitals:    07/10/20 0909 07/10/20 0956   BP: 160/78 136/77   BP Location: Left arm    Patient Position: Sitting    Cuff Size: Large Adult    Pulse: 77    Temp: 97.3 °F (36.3 °C)    TempSrc: Temporal    SpO2: 97%    Weight: 110 kg (243 lb)    Height: 157.5 cm (62\")        Body mass index is 44.45 kg/m².  Discussed the patient's BMI with her. The BMI is above average; no BMI management plan is appropriate..    Physical Exam    Lab Results   Component Value Date    TRIG 127 2020    HDL 51 2020    LDL 37 2020    VLDL 25.4 2020    HGBA1C 5.4 2020        Assessment/Plan   Medicare Risks and Personalized Health Plan  CMS Preventative Services Quick Reference  Breast Cancer/Mammogram Screening  Colon Cancer Screening  Inactivity/Sedentary  Osteoprorosis Risk    The above risks/problems have been discussed with the patient.  Pertinent information has been shared with the patient in the After Visit Summary.  Follow up plans and orders are seen below in the Assessment/Plan Section.    Diagnoses and all orders for this visit:    1. Medicare annual wellness visit, subsequent (Primary)  Comments:  Preventative screenings up-to-date, labs month within normal limits.  Patient's   in May 2020 of heart attack/stroke, she has been feeling sad and tearful since that time, however she denies feelings of suicidal ideation/homicidal ideation.  She declined medication for depression.  She has family that lives close by, her grandson comes over and helps her mow the " yard.    2. Chronic bilateral low back pain without sciatica  Comments:  Stable, patient to continue current medications.  Pain scale collected.  Orders:  -     HYDROcodone-acetaminophen (Norco) 5-325 MG per tablet; Take 1 tablet by mouth Every 8 (Eight) Hours As Needed for Moderate Pain  or Severe Pain .  Dispense: 30 tablet; Refill: 0    Other orders  -     loratadine (CLARITIN) 10 MG tablet; Take 1 tablet by mouth Daily.  Dispense: 90 tablet; Refill: 1      Follow Up:  Return in about 6 months (around 1/10/2021).     An After Visit Summary and PPPS were given to the patient.

## 2020-09-02 ENCOUNTER — OFFICE VISIT (OUTPATIENT)
Dept: FAMILY MEDICINE CLINIC | Facility: CLINIC | Age: 75
End: 2020-09-02

## 2020-09-02 VITALS
BODY MASS INDEX: 43.43 KG/M2 | HEIGHT: 62 IN | SYSTOLIC BLOOD PRESSURE: 146 MMHG | HEART RATE: 84 BPM | TEMPERATURE: 96.8 F | OXYGEN SATURATION: 99 % | WEIGHT: 236 LBS | DIASTOLIC BLOOD PRESSURE: 84 MMHG

## 2020-09-02 DIAGNOSIS — R30.0 DYSURIA: Primary | ICD-10-CM

## 2020-09-02 LAB
BILIRUB BLD-MCNC: NEGATIVE MG/DL
CLARITY, POC: CLEAR
COLOR UR: YELLOW
GLUCOSE UR STRIP-MCNC: NEGATIVE MG/DL
KETONES UR QL: NEGATIVE
LEUKOCYTE EST, POC: ABNORMAL
NITRITE UR-MCNC: POSITIVE MG/ML
PH UR: 5 [PH] (ref 5–8)
PROT UR STRIP-MCNC: NEGATIVE MG/DL
RBC # UR STRIP: NEGATIVE /UL
SP GR UR: 1.02 (ref 1–1.03)
UROBILINOGEN UR QL: NORMAL

## 2020-09-02 PROCEDURE — 87077 CULTURE AEROBIC IDENTIFY: CPT | Performed by: NURSE PRACTITIONER

## 2020-09-02 PROCEDURE — 87086 URINE CULTURE/COLONY COUNT: CPT | Performed by: NURSE PRACTITIONER

## 2020-09-02 PROCEDURE — 99213 OFFICE O/P EST LOW 20 MIN: CPT | Performed by: NURSE PRACTITIONER

## 2020-09-02 PROCEDURE — 81003 URINALYSIS AUTO W/O SCOPE: CPT | Performed by: NURSE PRACTITIONER

## 2020-09-02 PROCEDURE — 87186 SC STD MICRODIL/AGAR DIL: CPT | Performed by: NURSE PRACTITIONER

## 2020-09-02 RX ORDER — CEPHALEXIN 500 MG/1
500 CAPSULE ORAL 2 TIMES DAILY
Qty: 14 CAPSULE | Refills: 0 | Status: SHIPPED | OUTPATIENT
Start: 2020-09-02 | End: 2020-09-09

## 2020-09-02 RX ORDER — GABAPENTIN 300 MG/1
300 CAPSULE ORAL
COMMUNITY
Start: 2020-08-24 | End: 2020-12-18

## 2020-09-02 NOTE — PROGRESS NOTES
Subjective   Seema Silveira is a 74 y.o. female.       HPI   Pt. Is here today with concern of a UTI.  Her symptoms started 3-4 days ago.    She is having burning with urination; frequency and urgency.  She has not seen blood in the urine; denies any fevers. Has a mild pelvic pressure.  Denies any CP; palpitations; SOA: dizziness; headache; trouble with vision.  She has increased fluids.     The following portions of the patient's history were reviewed and updated as appropriate: allergies, current medications, past family history, past medical history, past social history, past surgical history and problem list.    Review of Systems   Constitutional: Negative for activity change, appetite change, chills, diaphoresis, fatigue, fever, unexpected weight gain and unexpected weight loss.   Respiratory: Negative for cough, chest tightness, shortness of breath and wheezing.    Cardiovascular: Negative for chest pain, palpitations and leg swelling.   Gastrointestinal: Negative for abdominal distention, abdominal pain, blood in stool, constipation, diarrhea, nausea, vomiting and indigestion.   Genitourinary: Positive for dysuria, frequency and urgency. Negative for decreased urine volume, flank pain, hematuria, pelvic pain, pelvic pressure, urinary incontinence, vaginal bleeding and vaginal discharge.   Musculoskeletal: Negative for arthralgias, back pain and myalgias.   Skin: Negative for rash.   Neurological: Negative for dizziness, weakness, light-headedness, headache and confusion.   Psychiatric/Behavioral: Negative for depressed mood. The patient is not nervous/anxious.        Objective   Physical Exam   Constitutional: She is oriented to person, place, and time. She appears well-developed and well-nourished. No distress.   Cardiovascular: Normal rate, regular rhythm, normal heart sounds and intact distal pulses.   No murmur heard.  Pulmonary/Chest: Effort normal and breath sounds normal. No respiratory distress.  She has no wheezes. She exhibits no tenderness.   Abdominal: Soft. Bowel sounds are normal. She exhibits no distension and no mass. There is tenderness (mild suprapubic tenderness). There is no rebound and no guarding.   Neurological: She is alert and oriented to person, place, and time.   Skin: Skin is warm and dry. Capillary refill takes less than 2 seconds. No erythema.   Psychiatric: She has a normal mood and affect.   Vitals reviewed.        Assessment/Plan   Seema was seen today for urinary tract infection.    Diagnoses and all orders for this visit:    Dysuria  Comments:  UA shows small leuks with positive nitrites.  Sent for cx.   Will start Keflex.    Increase fluids   Call for worsening.   Orders:  -     POC Urinalysis Dipstick, Automated  -     Urine Culture - Urine, Urine, Clean Catch  -     cephalexin (Keflex) 500 MG capsule; Take 1 capsule by mouth 2 (Two) Times a Day for 7 days.

## 2020-09-04 LAB — BACTERIA SPEC AEROBE CULT: ABNORMAL

## 2020-09-08 RX ORDER — METFORMIN HYDROCHLORIDE 500 MG/1
TABLET, EXTENDED RELEASE ORAL
Qty: 180 TABLET | Refills: 1 | Status: SHIPPED | OUTPATIENT
Start: 2020-09-08 | End: 2021-03-26 | Stop reason: SDUPTHER

## 2020-10-13 RX ORDER — HYDROXYCHLOROQUINE SULFATE 200 MG/1
200 TABLET, FILM COATED ORAL 2 TIMES DAILY
Qty: 60 TABLET | Refills: 1 | Status: CANCELLED | OUTPATIENT
Start: 2020-10-13 | End: 2020-12-12

## 2020-10-13 RX ORDER — HYDROXYCHLOROQUINE SULFATE 200 MG/1
200 TABLET, FILM COATED ORAL 2 TIMES DAILY
Qty: 60 TABLET | Refills: 2 | Status: SHIPPED | OUTPATIENT
Start: 2020-10-13 | End: 2020-11-12

## 2020-10-13 NOTE — TELEPHONE ENCOUNTER
Pt states that you said you would refill the selected medication since she no longer has a rheumatologist.    DLS 7/10/2020

## 2020-10-29 ENCOUNTER — TRANSCRIBE ORDERS (OUTPATIENT)
Dept: ADMINISTRATIVE | Facility: HOSPITAL | Age: 75
End: 2020-10-29

## 2020-10-29 DIAGNOSIS — Z12.39 SCREENING BREAST EXAMINATION: Primary | ICD-10-CM

## 2020-11-16 ENCOUNTER — HOSPITAL ENCOUNTER (OUTPATIENT)
Dept: MAMMOGRAPHY | Facility: HOSPITAL | Age: 75
Discharge: HOME | End: 2020-11-16
Admitting: PHYSICIAN ASSISTANT

## 2020-11-16 DIAGNOSIS — Z12.39 SCREENING BREAST EXAMINATION: ICD-10-CM

## 2020-11-16 PROCEDURE — 77063 BREAST TOMOSYNTHESIS BI: CPT

## 2020-11-16 PROCEDURE — 77067 SCR MAMMO BI INCL CAD: CPT

## 2020-12-18 ENCOUNTER — LAB (OUTPATIENT)
Dept: FAMILY MEDICINE CLINIC | Facility: CLINIC | Age: 75
End: 2020-12-18

## 2020-12-18 ENCOUNTER — OFFICE VISIT (OUTPATIENT)
Dept: FAMILY MEDICINE CLINIC | Facility: CLINIC | Age: 75
End: 2020-12-18

## 2020-12-18 VITALS
TEMPERATURE: 96.8 F | WEIGHT: 235 LBS | HEIGHT: 62 IN | DIASTOLIC BLOOD PRESSURE: 82 MMHG | HEART RATE: 77 BPM | OXYGEN SATURATION: 100 % | SYSTOLIC BLOOD PRESSURE: 134 MMHG | BODY MASS INDEX: 43.24 KG/M2

## 2020-12-18 DIAGNOSIS — E11.65 TYPE 2 DIABETES MELLITUS WITH HYPERGLYCEMIA, WITHOUT LONG-TERM CURRENT USE OF INSULIN (HCC): Primary | ICD-10-CM

## 2020-12-18 DIAGNOSIS — J44.9 CHRONIC OBSTRUCTIVE PULMONARY DISEASE, UNSPECIFIED COPD TYPE (HCC): ICD-10-CM

## 2020-12-18 DIAGNOSIS — I10 ESSENTIAL HYPERTENSION: ICD-10-CM

## 2020-12-18 DIAGNOSIS — E78.5 HYPERLIPIDEMIA, UNSPECIFIED HYPERLIPIDEMIA TYPE: ICD-10-CM

## 2020-12-18 DIAGNOSIS — E11.65 TYPE 2 DIABETES MELLITUS WITH HYPERGLYCEMIA, WITHOUT LONG-TERM CURRENT USE OF INSULIN (HCC): ICD-10-CM

## 2020-12-18 DIAGNOSIS — M54.50 CHRONIC BILATERAL LOW BACK PAIN WITHOUT SCIATICA: ICD-10-CM

## 2020-12-18 DIAGNOSIS — G89.29 CHRONIC BILATERAL LOW BACK PAIN WITHOUT SCIATICA: ICD-10-CM

## 2020-12-18 LAB
ALBUMIN SERPL-MCNC: 4.5 G/DL (ref 3.5–5.2)
ALBUMIN/GLOB SERPL: 1.9 G/DL
ALP SERPL-CCNC: 56 U/L (ref 39–117)
ALT SERPL W P-5'-P-CCNC: 16 U/L (ref 1–33)
ANION GAP SERPL CALCULATED.3IONS-SCNC: 10.4 MMOL/L (ref 5–15)
AST SERPL-CCNC: 15 U/L (ref 1–32)
BILIRUB SERPL-MCNC: 0.8 MG/DL (ref 0–1.2)
BUN SERPL-MCNC: 23 MG/DL (ref 8–23)
BUN/CREAT SERPL: 35.4 (ref 7–25)
CALCIUM SPEC-SCNC: 9.6 MG/DL (ref 8.6–10.5)
CHLORIDE SERPL-SCNC: 105 MMOL/L (ref 98–107)
CHOLEST SERPL-MCNC: 128 MG/DL (ref 0–200)
CO2 SERPL-SCNC: 25.6 MMOL/L (ref 22–29)
CREAT SERPL-MCNC: 0.65 MG/DL (ref 0.57–1)
GFR SERPL CREATININE-BSD FRML MDRD: 89 ML/MIN/1.73
GLOBULIN UR ELPH-MCNC: 2.4 GM/DL
GLUCOSE SERPL-MCNC: 92 MG/DL (ref 65–99)
HBA1C MFR BLD: 5.5 % (ref 3.5–5.6)
HDLC SERPL-MCNC: 58 MG/DL (ref 40–60)
LDLC SERPL CALC-MCNC: 48 MG/DL (ref 0–100)
LDLC/HDLC SERPL: 0.78 {RATIO}
POTASSIUM SERPL-SCNC: 4.5 MMOL/L (ref 3.5–5.2)
PROT SERPL-MCNC: 6.9 G/DL (ref 6–8.5)
SODIUM SERPL-SCNC: 141 MMOL/L (ref 136–145)
TRIGL SERPL-MCNC: 124 MG/DL (ref 0–150)
VLDLC SERPL-MCNC: 22 MG/DL (ref 5–40)

## 2020-12-18 PROCEDURE — 83036 HEMOGLOBIN GLYCOSYLATED A1C: CPT | Performed by: NURSE PRACTITIONER

## 2020-12-18 PROCEDURE — 36415 COLL VENOUS BLD VENIPUNCTURE: CPT

## 2020-12-18 PROCEDURE — 80053 COMPREHEN METABOLIC PANEL: CPT | Performed by: NURSE PRACTITIONER

## 2020-12-18 PROCEDURE — 99214 OFFICE O/P EST MOD 30 MIN: CPT | Performed by: NURSE PRACTITIONER

## 2020-12-18 PROCEDURE — 80061 LIPID PANEL: CPT | Performed by: NURSE PRACTITIONER

## 2020-12-18 RX ORDER — HYDROCODONE BITARTRATE AND ACETAMINOPHEN 5; 325 MG/1; MG/1
1 TABLET ORAL EVERY 8 HOURS PRN
Qty: 30 TABLET | Refills: 0 | Status: SHIPPED | OUTPATIENT
Start: 2020-12-18 | End: 2021-12-15 | Stop reason: SDUPTHER

## 2020-12-18 RX ORDER — ISOSORBIDE MONONITRATE 30 MG/1
30 TABLET, EXTENDED RELEASE ORAL DAILY
COMMUNITY

## 2020-12-18 RX ORDER — DORZOLAMIDE HYDROCHLORIDE AND TIMOLOL MALEATE 20; 5 MG/ML; MG/ML
SOLUTION/ DROPS OPHTHALMIC
COMMUNITY
Start: 2020-10-23

## 2020-12-18 RX ORDER — NITROGLYCERIN 0.4 MG/1
TABLET SUBLINGUAL
COMMUNITY
Start: 2020-11-25

## 2020-12-18 RX ORDER — HYDROXYCHLOROQUINE SULFATE 200 MG/1
TABLET, FILM COATED ORAL
COMMUNITY
Start: 2020-12-12 | End: 2021-04-21 | Stop reason: SDUPTHER

## 2020-12-18 RX ORDER — ISOSORBIDE MONONITRATE 30 MG/1
30 TABLET, EXTENDED RELEASE ORAL DAILY
COMMUNITY
Start: 2020-11-25 | End: 2020-12-18

## 2020-12-18 NOTE — PROGRESS NOTES
Subjective   Seema Silveira is a 75 y.o. female.       HPI   Pt. Is here today for medication follow up.   1) T2DM - currently on metformin  mg 2 tabs daily.  Had diabetic eye exam 4 months ago.  Denies any skin lesions or sores that are not healing.  Doesn't have home BS readings with her today.  Labs due.  Last A1C was under 6% (July 2020).  Denies any polyuria or polydipsia.    2) HTN-  Currently on ramipril 10 mg daily; metoprolol 50 mg 1 1/2 tabs bid; Imdur 30 mg daily.  Followed by cardiology.  BP has been stable.  Denies any Cp; palpitations; SOA; dizziness; headache; trouble with vision.    3) Hyperlipidemia - currently on atorvastatin 40 mg daily. Labs due  4) Chronic back pain - needs refill on Norco.  Last fill for qty 30 was in July.  She takes this as needed for lower back pain.  Pain has been stable.   5) COPD - needs refill on Anoro inhaler.  Denies any current cough or wheezes.    [place] be leaving [place] for [place] 3 months.    Had flu shot earlier this month.   The following portions of the patient's history were reviewed and updated as appropriate: allergies, current medications, past family history, past medical history, past social history, past surgical history and problem list.    Review of Systems   Constitutional: Negative for activity change, appetite change, chills, diaphoresis, fatigue, fever, unexpected weight gain and unexpected weight loss.   Eyes: Negative for blurred vision, double vision, photophobia and visual disturbance.   Respiratory: Negative for cough, chest tightness, shortness of breath and wheezing.    Cardiovascular: Negative for chest pain, palpitations and leg swelling.   Gastrointestinal: Negative for abdominal distention, abdominal pain, blood in stool, constipation, diarrhea, nausea, vomiting and indigestion.   Endocrine: Negative for polydipsia, polyphagia and polyuria.   Genitourinary: Negative for decreased urine volume, difficulty  urinating, dysuria, flank pain, frequency, hematuria, pelvic pain, pelvic pressure, urgency and urinary incontinence.   Musculoskeletal: Positive for back pain (chronic low back pain). Negative for arthralgias and myalgias.   Skin: Negative for rash and skin lesions.   Neurological: Negative for dizziness, weakness, light-headedness, numbness, headache and confusion.   Hematological: Negative for adenopathy.   Psychiatric/Behavioral: Negative for sleep disturbance and depressed mood. The patient is not nervous/anxious.        Objective   Physical Exam  Vitals signs reviewed.   Constitutional:       General: She is not in acute distress.     Appearance: Normal appearance. She is obese.   HENT:      Head: Normocephalic and atraumatic.      Nose: Nose normal.      Mouth/Throat:      Mouth: Mucous membranes are moist.      Pharynx: Oropharynx is clear.   Eyes:      Conjunctiva/sclera: Conjunctivae normal.   Neck:      Musculoskeletal: Normal range of motion and neck supple. No muscular tenderness.   Cardiovascular:      Rate and Rhythm: Normal rate and regular rhythm.      Pulses: Normal pulses.      Heart sounds: Normal heart sounds. No murmur.   Pulmonary:      Effort: Pulmonary effort is normal. No respiratory distress.      Breath sounds: Normal breath sounds. No wheezing or rhonchi.   Chest:      Chest wall: No tenderness.   Abdominal:      General: Abdomen is flat. Bowel sounds are normal. There is no distension.      Palpations: Abdomen is soft.      Tenderness: There is no abdominal tenderness. There is no right CVA tenderness or left CVA tenderness.   Musculoskeletal:      Right lower leg: No edema.      Left lower leg: No edema.   Skin:     General: Skin is warm and dry.      Capillary Refill: Capillary refill takes less than 2 seconds.      Findings: No erythema.   Neurological:      General: No focal deficit present.      Mental Status: She is alert and oriented to person, place, and time.   Psychiatric:          Mood and Affect: Mood normal.           Assessment/Plan   Diagnoses and all orders for this visit:    1. Type 2 diabetes mellitus with hyperglycemia, without long-term current use of insulin (CMS/[facility]) (Primary)  Comments:  Stable.   Cont. current medications.   Labs today.   Orders:  -     Comprehensive metabolic panel; Future  -     Hemoglobin A1c; Future  -     Lipid panel; Future    2. Essential hypertension  Comments:  Stable.   Cont. current medications.   Labs today.   Orders:  -     Comprehensive metabolic panel; Future  -     Hemoglobin A1c; Future  -     Lipid panel; Future    3. Hyperlipidemia, unspecified hyperlipidemia type  Comments:  Stable.   Cont. current medications.   Labs today.   Orders:  -     Comprehensive metabolic panel; Future  -     Hemoglobin A1c; Future  -     Lipid panel; Future    4. Chronic bilateral low back pain without sciatica  Comments:  Stable.   Cont. current medications.   Labs today.   Orders:  -     HYDROcodone-acetaminophen (Norco) 5-325 MG per tablet; Take 1 tablet by mouth Every 8 (Eight) Hours As Needed for Moderate Pain  or Severe Pain .  Dispense: 30 tablet; Refill: 0    5. Chronic obstructive pulmonary disease, unspecified COPD type (CMS/[facility])  Comments:  Stable.   Cont. current medications.   Labs today.   Orders:  -     umeclidinium-vilanterol (Anoro Ellipta) 62.5-25 MCG/INH aerosol powder  inhaler; Inhale 1 puff Daily.  Dispense: 1 each; Refill: 5

## 2021-02-11 RX ORDER — HYDROXYCHLOROQUINE SULFATE 200 MG/1
200 TABLET, FILM COATED ORAL DAILY
OUTPATIENT
Start: 2021-02-11

## 2021-03-26 RX ORDER — METFORMIN HYDROCHLORIDE 500 MG/1
1000 TABLET, EXTENDED RELEASE ORAL
Qty: 180 TABLET | Refills: 1 | Status: SHIPPED | OUTPATIENT
Start: 2021-03-26 | End: 2021-08-12 | Stop reason: SDUPTHER

## 2021-04-21 ENCOUNTER — TELEPHONE (OUTPATIENT)
Dept: FAMILY MEDICINE CLINIC | Facility: CLINIC | Age: 76
End: 2021-04-21

## 2021-04-21 DIAGNOSIS — R76.8 ANA POSITIVE: Primary | ICD-10-CM

## 2021-04-21 RX ORDER — HYDROXYCHLOROQUINE SULFATE 200 MG/1
TABLET, FILM COATED ORAL DAILY
Status: CANCELLED | OUTPATIENT
Start: 2021-04-21

## 2021-04-21 RX ORDER — HYDROXYCHLOROQUINE SULFATE 200 MG/1
200 TABLET, FILM COATED ORAL DAILY
Qty: 30 TABLET | Refills: 0 | Status: SHIPPED | OUTPATIENT
Start: 2021-04-21 | End: 2021-05-17 | Stop reason: SDUPTHER

## 2021-04-21 NOTE — TELEPHONE ENCOUNTER
I will send in one refill on this med but a Rheumatologist needs to be seeing her after this.  I have put in a referral for a new rheumatology office.

## 2021-04-21 NOTE — TELEPHONE ENCOUNTER
Caller: Seema Silveira    Relationship: Self    Best call back number: [phone]    What is the best time to reach you: ANYTIME    Who are you requesting to speak with (clinical staff, provider,  specific staff member): NURSE      What was the call regarding: PATIENT IS WANTING A REFILL OF PLAQUENIL 200MG TABLET. SHE GOT IT LAST FILLED IN [place] AND THE PHARMACY SAID THAT IT HAD TO BE CALLED IN. IT WILL NOT LET HUB PICK IT FROM [employer]. PATIENT IS OUT OF MEDICATION.    Do you require a callback: YES    CONFIRMED PHARMACY:  [facility]/pharmacy #[id number] - [place], [place] - [address] . - [phone]  - [phone]   [phone]

## 2021-05-17 DIAGNOSIS — R76.8 ANA POSITIVE: ICD-10-CM

## 2021-05-17 RX ORDER — HYDROXYCHLOROQUINE SULFATE 200 MG/1
TABLET, FILM COATED ORAL
Qty: 30 TABLET | Refills: 0 | Status: CANCELLED | OUTPATIENT
Start: 2021-05-17

## 2021-05-17 RX ORDER — HYDROXYCHLOROQUINE SULFATE 200 MG/1
200 TABLET, FILM COATED ORAL DAILY
Qty: 30 TABLET | Refills: 0 | Status: SHIPPED | OUTPATIENT
Start: 2021-05-17 | End: 2021-06-09 | Stop reason: SDUPTHER

## 2021-06-09 RX ORDER — HYDROXYCHLOROQUINE SULFATE 200 MG/1
200 TABLET, FILM COATED ORAL DAILY
Qty: 30 TABLET | Refills: 0 | Status: CANCELLED | OUTPATIENT
Start: 2021-06-09

## 2021-06-09 RX ORDER — HYDROXYCHLOROQUINE SULFATE 200 MG/1
200 TABLET, FILM COATED ORAL DAILY
Qty: 30 TABLET | Refills: 0 | Status: SHIPPED | OUTPATIENT
Start: 2021-06-09 | End: 2021-07-06 | Stop reason: SDUPTHER

## 2021-06-09 NOTE — TELEPHONE ENCOUNTER
Referral form and records faxed on 4.21.21 and received a fax confirmation. I gave pt the info to call them to schedule. She said she may not have answered it since it was a [phone] number. I told her to call me back if they need anything re faxed.

## 2021-06-09 NOTE — TELEPHONE ENCOUNTER
Patient states no one has ever contacted her regarding the rheumatology referral. I will have heather check into this.

## 2021-06-22 ENCOUNTER — OFFICE VISIT (OUTPATIENT)
Dept: FAMILY MEDICINE CLINIC | Facility: CLINIC | Age: 76
End: 2021-06-22

## 2021-06-22 VITALS
WEIGHT: 236 LBS | OXYGEN SATURATION: 98 % | HEART RATE: 79 BPM | BODY MASS INDEX: 43.43 KG/M2 | SYSTOLIC BLOOD PRESSURE: 157 MMHG | DIASTOLIC BLOOD PRESSURE: 88 MMHG | HEIGHT: 62 IN

## 2021-06-22 DIAGNOSIS — Z12.11 COLON CANCER SCREENING: ICD-10-CM

## 2021-06-22 DIAGNOSIS — J42 CHRONIC BRONCHITIS, UNSPECIFIED CHRONIC BRONCHITIS TYPE (HCC): ICD-10-CM

## 2021-06-22 DIAGNOSIS — I10 ESSENTIAL HYPERTENSION: Primary | ICD-10-CM

## 2021-06-22 DIAGNOSIS — E78.2 MIXED HYPERLIPIDEMIA: ICD-10-CM

## 2021-06-22 DIAGNOSIS — E11.65 TYPE 2 DIABETES MELLITUS WITH HYPERGLYCEMIA, WITHOUT LONG-TERM CURRENT USE OF INSULIN (HCC): ICD-10-CM

## 2021-06-22 DIAGNOSIS — G89.29 CHRONIC BILATERAL LOW BACK PAIN, UNSPECIFIED WHETHER SCIATICA PRESENT: ICD-10-CM

## 2021-06-22 DIAGNOSIS — M54.50 CHRONIC BILATERAL LOW BACK PAIN, UNSPECIFIED WHETHER SCIATICA PRESENT: ICD-10-CM

## 2021-06-22 PROCEDURE — 99214 OFFICE O/P EST MOD 30 MIN: CPT | Performed by: NURSE PRACTITIONER

## 2021-06-22 RX ORDER — CYANOCOBALAMIN (VITAMIN B-12) 500 MCG
1 LOZENGE ORAL DAILY
COMMUNITY

## 2021-06-22 RX ORDER — MELATONIN
1 DAILY
COMMUNITY

## 2021-06-22 RX ORDER — BLOOD-GLUCOSE METER
KIT MISCELLANEOUS
Qty: 100 EACH | Refills: 12 | Status: SHIPPED | OUTPATIENT
Start: 2021-06-22

## 2021-06-22 RX ORDER — MULTIPLE VITAMINS W/ MINERALS TAB 9MG-400MCG
1 TAB ORAL DAILY
COMMUNITY

## 2021-06-22 NOTE — PROGRESS NOTES
Subjective   Seema Silveira is a 75 y.o. female.       HPI   Pt. is here today for medication follow up.   1) T2DM - currently on metformin  mg 2 tabs daily.  Had diabetic eye exam 4 months ago; gets an eye exam every 4 months with Jeny.  Denies any skin lesions or sores that are not healing; denies numbness/tinlging in feet.  Last A1C was 5.5 % (Dec 2020).  Denies any polyuria or polydipsia.  Glucometer is broken; needs script for new one.   2) HTN-  Currently on ramipril 10 mg daily; metoprolol 50 mg 1 1/2 tabs bid; Imdur 30 mg daily.  Followed by cardiology; last visit was 2 weeks ago and pt reports no changes were made.   BP has been stable.  Denies any Cp; palpitations; SOA; dizziness; headache; trouble with vision.   3) Hyperlipidemia - currently on atorvastatin 40 mg daily. Labs due  4) Chronic back pain - currently has Norco that she uses sparingly; manages mostly with ES Tylenol.   Last fill for qty 30 of [place] was in Dec 2020.   Pain has been stable.   5) COPD - currently on Anoro inhaler; albuterol PRN.  Denies any current SOA, cough or wheezes.    6) RA- currently on Plaquenil 200 mg daily.  Pt. has been referred to Rheumatology to take over care for this.  Has appt July 27th.    7) Given order for screening colonoscopy.  Mammogram not due until Oct. 2021.     The following portions of the patient's history were reviewed and updated as appropriate: allergies, current medications, past family history, past medical history, past social history, past surgical history and problem list.    Review of Systems   Constitutional: Negative for activity change, appetite change, chills, diaphoresis, fatigue, fever, unexpected weight gain and unexpected weight loss.   Eyes: Negative for blurred vision, double vision and visual disturbance.   Respiratory: Negative for cough, chest tightness, shortness of breath and wheezing.    Cardiovascular: Negative for chest pain, palpitations and leg  swelling.   Gastrointestinal: Negative for abdominal distention, abdominal pain, blood in stool, constipation, diarrhea, nausea, vomiting and indigestion.   Genitourinary: Negative for decreased urine volume, difficulty urinating, dysuria, flank pain, frequency, hematuria, urgency and urinary incontinence.   Musculoskeletal: Positive for arthralgias, back pain (chronic) and myalgias.   Skin: Negative for rash.   Neurological: Negative for dizziness, weakness, light-headedness, headache and confusion.   Psychiatric/Behavioral: Negative for depressed mood. The patient is not nervous/anxious.        Objective   Physical Exam  Vitals reviewed.   Constitutional:       General: She is not in acute distress.     Appearance: Normal appearance. She is obese.   HENT:      Head: Normocephalic and atraumatic.   Cardiovascular:      Rate and Rhythm: Normal rate.      Pulses: Normal pulses.      Heart sounds: Normal heart sounds. No murmur heard.     Pulmonary:      Effort: Pulmonary effort is normal. No respiratory distress.      Breath sounds: Normal breath sounds. No rhonchi.   Chest:      Chest wall: No tenderness.   Abdominal:      General: Bowel sounds are normal. There is no distension.      Tenderness: There is no abdominal tenderness. There is no right CVA tenderness or left CVA tenderness.   Musculoskeletal:      Cervical back: Normal range of motion and neck supple. No tenderness.      Right lower leg: Edema (trace) present.      Left lower leg: Edema (trace) present.   Skin:     General: Skin is warm.      Capillary Refill: Capillary refill takes less than 2 seconds.      Findings: No erythema, lesion or rash.   Neurological:      General: No focal deficit present.      Mental Status: She is alert and oriented to person, place, and time.      Sensory: No sensory deficit.   Psychiatric:         Mood and Affect: Mood normal.           Assessment/Plan   Diagnoses and all orders for this visit:    1. Essential  hypertension (Primary)  Comments:  Stable.   Cont. current medicaiton.   Labs ordered.   Keep follow up with cardiology as directed.   Orders:  -     Comprehensive metabolic panel; Future  -     Lipid panel; Future  -     Hemoglobin A1c; Future    2. Type 2 diabetes mellitus with hyperglycemia, without long-term current use of insulin (CMS/[facility])  Comments:  Stable.   Cont. current medicaiton.   Labs ordered.   Eye exam UTD.   Handout in AVS.  Orders:  -     Comprehensive metabolic panel; Future  -     Lipid panel; Future  -     Hemoglobin A1c; Future  -     glucose blood (FREESTYLE LITE) test strip; Use to test blood sugar once daily E11.65  Dispense: 100 each; Refill: 12    3. Mixed hyperlipidemia  Comments:  Stable.   Cont. current medicaiton.   Labs ordered.   Orders:  -     Comprehensive metabolic panel; Future  -     Lipid panel; Future  -     Hemoglobin A1c; Future    4. Chronic bilateral low back pain, unspecified whether sciatica present  Comments:  Stable.   Cont. current medicaiton.   Labs ordered.     5. Chronic bronchitis, unspecified chronic bronchitis type (CMS/[facility])  Comments:  Stable.   Cont. current medicaiton.   Labs ordered.     6. Colon cancer screening  Comments:  Order given for colonoscopy.   Orders:  -     Ambulatory Referral For Screening Colonoscopy

## 2021-06-29 ENCOUNTER — LAB (OUTPATIENT)
Dept: FAMILY MEDICINE CLINIC | Facility: CLINIC | Age: 76
End: 2021-06-29

## 2021-06-29 DIAGNOSIS — E11.65 TYPE 2 DIABETES MELLITUS WITH HYPERGLYCEMIA, WITHOUT LONG-TERM CURRENT USE OF INSULIN (HCC): ICD-10-CM

## 2021-06-29 DIAGNOSIS — E78.2 MIXED HYPERLIPIDEMIA: ICD-10-CM

## 2021-06-29 DIAGNOSIS — I10 ESSENTIAL HYPERTENSION: ICD-10-CM

## 2021-06-29 LAB
ALBUMIN SERPL-MCNC: 4 G/DL (ref 3.5–5.2)
ALBUMIN/GLOB SERPL: 1.9 G/DL
ALP SERPL-CCNC: 45 U/L (ref 39–117)
ALT SERPL W P-5'-P-CCNC: 12 U/L (ref 1–33)
ANION GAP SERPL CALCULATED.3IONS-SCNC: 9.6 MMOL/L (ref 5–15)
AST SERPL-CCNC: 14 U/L (ref 1–32)
BILIRUB SERPL-MCNC: 0.7 MG/DL (ref 0–1.2)
BUN SERPL-MCNC: 20 MG/DL (ref 8–23)
BUN/CREAT SERPL: 25 (ref 7–25)
CALCIUM SPEC-SCNC: 9.3 MG/DL (ref 8.6–10.5)
CHLORIDE SERPL-SCNC: 106 MMOL/L (ref 98–107)
CHOLEST SERPL-MCNC: 118 MG/DL (ref 0–200)
CO2 SERPL-SCNC: 25.4 MMOL/L (ref 22–29)
CREAT SERPL-MCNC: 0.8 MG/DL (ref 0.57–1)
GFR SERPL CREATININE-BSD FRML MDRD: 70 ML/MIN/1.73
GLOBULIN UR ELPH-MCNC: 2.1 GM/DL
GLUCOSE SERPL-MCNC: 87 MG/DL (ref 65–99)
HBA1C MFR BLD: 5.7 % (ref 3.5–5.6)
HDLC SERPL-MCNC: 44 MG/DL (ref 40–60)
LDLC SERPL CALC-MCNC: 45 MG/DL (ref 0–100)
LDLC/HDLC SERPL: 0.88 {RATIO}
POTASSIUM SERPL-SCNC: 4.6 MMOL/L (ref 3.5–5.2)
PROT SERPL-MCNC: 6.1 G/DL (ref 6–8.5)
SODIUM SERPL-SCNC: 141 MMOL/L (ref 136–145)
TRIGL SERPL-MCNC: 176 MG/DL (ref 0–150)
VLDLC SERPL-MCNC: 29 MG/DL (ref 5–40)

## 2021-06-29 PROCEDURE — 80061 LIPID PANEL: CPT | Performed by: NURSE PRACTITIONER

## 2021-06-29 PROCEDURE — 36415 COLL VENOUS BLD VENIPUNCTURE: CPT

## 2021-06-29 PROCEDURE — 80053 COMPREHEN METABOLIC PANEL: CPT | Performed by: NURSE PRACTITIONER

## 2021-06-29 PROCEDURE — 83036 HEMOGLOBIN GLYCOSYLATED A1C: CPT | Performed by: NURSE PRACTITIONER

## 2021-07-04 RX ORDER — HYDROXYCHLOROQUINE SULFATE 200 MG/1
200 TABLET, FILM COATED ORAL DAILY
Qty: 30 TABLET | Refills: 0 | OUTPATIENT
Start: 2021-07-04 | End: 2021-08-03

## 2021-07-06 RX ORDER — HYDROXYCHLOROQUINE SULFATE 200 MG/1
200 TABLET, FILM COATED ORAL DAILY
Qty: 30 TABLET | Refills: 0 | Status: SHIPPED | OUTPATIENT
Start: 2021-07-06 | End: 2021-08-03 | Stop reason: SDUPTHER

## 2021-08-03 RX ORDER — HYDROXYCHLOROQUINE SULFATE 200 MG/1
200 TABLET, FILM COATED ORAL DAILY
Qty: 30 TABLET | Refills: 0 | Status: SHIPPED | OUTPATIENT
Start: 2021-08-03 | End: 2021-08-12 | Stop reason: SDUPTHER

## 2021-08-03 RX ORDER — HYDROXYCHLOROQUINE SULFATE 200 MG/1
200 TABLET, FILM COATED ORAL DAILY
Qty: 30 TABLET | Refills: 0 | Status: CANCELLED | OUTPATIENT
Start: 2021-08-03

## 2021-08-12 RX ORDER — METFORMIN HYDROCHLORIDE 500 MG/1
1000 TABLET, EXTENDED RELEASE ORAL
Qty: 180 TABLET | Refills: 1 | Status: SHIPPED | OUTPATIENT
Start: 2021-08-12 | End: 2021-12-20

## 2021-08-12 RX ORDER — HYDROXYCHLOROQUINE SULFATE 200 MG/1
200 TABLET, FILM COATED ORAL DAILY
Qty: 30 TABLET | Refills: 0 | Status: CANCELLED | OUTPATIENT
Start: 2021-08-12 | End: 2021-09-11

## 2021-08-12 RX ORDER — HYDROXYCHLOROQUINE SULFATE 200 MG/1
200 TABLET, FILM COATED ORAL DAILY
Qty: 30 TABLET | Refills: 0 | Status: SHIPPED | OUTPATIENT
Start: 2021-08-12 | End: 2021-09-11

## 2021-09-23 RX ORDER — HYDROXYCHLOROQUINE SULFATE 200 MG/1
200 TABLET, FILM COATED ORAL DAILY
Qty: 30 TABLET | Refills: 0 | OUTPATIENT
Start: 2021-09-23 | End: 2021-10-23

## 2021-10-28 ENCOUNTER — TRANSCRIBE ORDERS (OUTPATIENT)
Dept: ADMINISTRATIVE | Facility: HOSPITAL | Age: 76
End: 2021-10-28

## 2021-10-28 DIAGNOSIS — Z12.31 VISIT FOR SCREENING MAMMOGRAM: Primary | ICD-10-CM

## 2021-10-28 RX ORDER — HYDROXYCHLOROQUINE SULFATE 200 MG/1
200 TABLET, FILM COATED ORAL DAILY
Qty: 30 TABLET | Refills: 0 | OUTPATIENT
Start: 2021-10-28 | End: 2021-11-27

## 2021-12-01 ENCOUNTER — HOSPITAL ENCOUNTER (OUTPATIENT)
Dept: MAMMOGRAPHY | Facility: HOSPITAL | Age: 76
Discharge: HOME | End: 2021-12-01
Admitting: NURSE PRACTITIONER

## 2021-12-01 DIAGNOSIS — Z12.31 VISIT FOR SCREENING MAMMOGRAM: ICD-10-CM

## 2021-12-01 PROCEDURE — 77067 SCR MAMMO BI INCL CAD: CPT

## 2021-12-01 PROCEDURE — 77063 BREAST TOMOSYNTHESIS BI: CPT

## 2021-12-13 NOTE — PROGRESS NOTES
Subjective   Seema Silveira is a 76 y.o. female.       HPI   Pt. is here today for medication follow up.   1) T2DM - currently on metformin  mg 2 tabs daily.  Had diabetic eye exam earlier this year; gets an eye exam every 4 months with Jeny.  Denies any skin lesions or sores that are not healing; denies numbness/tinlging in feet.  Last A1C was 5.7 % (June 2021).  Denies any polyuria or polydipsia.  -112 at home.    2) HTN-  Currently on ramipril 10 mg daily; metoprolol 50 mg 1 1/2 tabs bid; Imdur 30 mg daily.  Followed by cardiology; last visit was last week and pt reports no changes were made.   BP has been stable.  Denies any Cp; palpitations; SOA; dizziness; headache; trouble with vision.   3) Hyperlipidemia - currently on atorvastatin 40 mg daily. Labs due  4) Chronic back pain - currently has Norco that she uses sparingly; manages mostly with ES Tylenol.   Last fill for qty 30 of [place] was in Dec 2020.   Pain has been stable.   5) COPD - currently on Anoro inhaler; albuterol PRN.  Denies any current SOA, cough or wheezes.      The following portions of the patient's history were reviewed and updated as appropriate: allergies, current medications, past family history, past medical history, past social history, past surgical history and problem list.    Review of Systems   Constitutional: Negative for activity change, appetite change, chills, diaphoresis, fatigue and fever.   Eyes: Negative for visual disturbance.   Respiratory: Negative for cough, chest tightness, shortness of breath and wheezing.    Cardiovascular: Negative for chest pain, palpitations and leg swelling.   Gastrointestinal: Negative for abdominal distention, abdominal pain, blood in stool, constipation, diarrhea, nausea, vomiting and indigestion.   Endocrine: Negative for polydipsia, polyphagia and polyuria.   Genitourinary: Negative for difficulty urinating, dysuria, flank pain, frequency, hematuria and urgency.    Musculoskeletal: Negative for arthralgias, back pain and myalgias.   Skin: Negative for rash.   Neurological: Negative for dizziness, weakness, light-headedness, headache and confusion.   Psychiatric/Behavioral: Negative for depressed mood. The patient is not nervous/anxious.        Objective   Physical Exam  Vitals reviewed.   Constitutional:       General: She is not in acute distress.     Appearance: Normal appearance. She is obese.   Cardiovascular:      Rate and Rhythm: Normal rate and regular rhythm.      Pulses: Normal pulses.      Heart sounds: Normal heart sounds. No murmur heard.      Pulmonary:      Effort: Pulmonary effort is normal. No respiratory distress.      Breath sounds: Normal breath sounds. No wheezing or rhonchi.   Chest:      Chest wall: No tenderness.   Abdominal:      General: Bowel sounds are normal. There is no distension.      Palpations: Abdomen is soft.      Tenderness: There is no abdominal tenderness. There is no right CVA tenderness or left CVA tenderness.   Musculoskeletal:      Cervical back: Normal range of motion and neck supple. No tenderness.      Right lower leg: No edema.      Left lower leg: No edema.   Skin:     General: Skin is warm and dry.      Findings: No erythema.   Neurological:      General: No focal deficit present.      Mental Status: She is alert and oriented to person, place, and time.   Psychiatric:         Mood and Affect: Mood normal.           Assessment/Plan   Diagnoses and all orders for this visit:    1. Type 2 diabetes mellitus with hyperglycemia, without long-term current use of insulin (HCC) (Primary)  Comments:  Stable.   Cont. current medication.   Labs ordered.   Eye exam UTD.   Orders:  -     Comprehensive metabolic panel; Future  -     Lipid panel; Future  -     Hemoglobin A1c; Future  -     MicroAlbumin, Urine, Random - Urine, Clean Catch; Future    2. Essential hypertension  Comments:  Stable.   Cont. current medication.   Labs ordered.   RTO  in 6 mo.   Orders:  -     Comprehensive metabolic panel; Future  -     Lipid panel; Future  -     Hemoglobin A1c; Future    3. Mixed hyperlipidemia  Comments:  Stable.   Cont. current medication.   Labs ordered.   RTO in 6 mo.   Orders:  -     Comprehensive metabolic panel; Future  -     Lipid panel; Future  -     Hemoglobin A1c; Future    4. Chronic bronchitis, unspecified chronic bronchitis type (HCC)  Comments:  Stable.   Cont. current medication.   Labs ordered.   RTO in 6 mo.     5. Chronic low back pain, unspecified back pain laterality, unspecified whether sciatica present  Comments:  Stable.   INSPECT reviewed.   Cont. current medication.   Labs ordered.   RTO in 6 mo.   Orders:  -     HYDROcodone-acetaminophen (Norco) 5-325 MG per tablet; Take 1 tablet by mouth Every 8 (Eight) Hours As Needed for Moderate Pain  or Severe Pain .  Dispense: 30 tablet; Refill: 0

## 2021-12-15 ENCOUNTER — OFFICE VISIT (OUTPATIENT)
Dept: FAMILY MEDICINE CLINIC | Facility: CLINIC | Age: 76
End: 2021-12-15

## 2021-12-15 VITALS
OXYGEN SATURATION: 99 % | HEART RATE: 69 BPM | DIASTOLIC BLOOD PRESSURE: 78 MMHG | SYSTOLIC BLOOD PRESSURE: 137 MMHG | HEIGHT: 62 IN | BODY MASS INDEX: 43.61 KG/M2 | WEIGHT: 237 LBS

## 2021-12-15 DIAGNOSIS — E78.2 MIXED HYPERLIPIDEMIA: ICD-10-CM

## 2021-12-15 DIAGNOSIS — G89.29 CHRONIC LOW BACK PAIN, UNSPECIFIED BACK PAIN LATERALITY, UNSPECIFIED WHETHER SCIATICA PRESENT: ICD-10-CM

## 2021-12-15 DIAGNOSIS — J42 CHRONIC BRONCHITIS, UNSPECIFIED CHRONIC BRONCHITIS TYPE (HCC): ICD-10-CM

## 2021-12-15 DIAGNOSIS — M54.50 CHRONIC LOW BACK PAIN, UNSPECIFIED BACK PAIN LATERALITY, UNSPECIFIED WHETHER SCIATICA PRESENT: ICD-10-CM

## 2021-12-15 DIAGNOSIS — I10 ESSENTIAL HYPERTENSION: ICD-10-CM

## 2021-12-15 DIAGNOSIS — E11.65 TYPE 2 DIABETES MELLITUS WITH HYPERGLYCEMIA, WITHOUT LONG-TERM CURRENT USE OF INSULIN (HCC): Primary | ICD-10-CM

## 2021-12-15 PROCEDURE — 99214 OFFICE O/P EST MOD 30 MIN: CPT | Performed by: NURSE PRACTITIONER

## 2021-12-15 RX ORDER — DULOXETIN HYDROCHLORIDE 30 MG/1
CAPSULE, DELAYED RELEASE ORAL
COMMUNITY
Start: 2021-11-19 | End: 2022-06-15

## 2021-12-15 RX ORDER — HYDROCODONE BITARTRATE AND ACETAMINOPHEN 5; 325 MG/1; MG/1
1 TABLET ORAL EVERY 8 HOURS PRN
Qty: 30 TABLET | Refills: 0 | Status: SHIPPED | OUTPATIENT
Start: 2021-12-15

## 2021-12-15 RX ORDER — FUROSEMIDE 40 MG/1
TABLET ORAL
COMMUNITY
Start: 2021-12-09

## 2021-12-16 ENCOUNTER — LAB (OUTPATIENT)
Dept: FAMILY MEDICINE CLINIC | Facility: CLINIC | Age: 76
End: 2021-12-16

## 2021-12-16 DIAGNOSIS — E78.2 MIXED HYPERLIPIDEMIA: ICD-10-CM

## 2021-12-16 DIAGNOSIS — I10 ESSENTIAL HYPERTENSION: ICD-10-CM

## 2021-12-16 DIAGNOSIS — E11.65 TYPE 2 DIABETES MELLITUS WITH HYPERGLYCEMIA, WITHOUT LONG-TERM CURRENT USE OF INSULIN (HCC): ICD-10-CM

## 2021-12-16 LAB
ALBUMIN SERPL-MCNC: 4.3 G/DL (ref 3.5–5.2)
ALBUMIN UR-MCNC: <1.2 MG/DL
ALBUMIN/GLOB SERPL: 1.9 G/DL
ALP SERPL-CCNC: 62 U/L (ref 39–117)
ALT SERPL W P-5'-P-CCNC: 16 U/L (ref 1–33)
ANION GAP SERPL CALCULATED.3IONS-SCNC: 9.3 MMOL/L (ref 5–15)
AST SERPL-CCNC: 12 U/L (ref 1–32)
BILIRUB SERPL-MCNC: 0.8 MG/DL (ref 0–1.2)
BUN SERPL-MCNC: 25 MG/DL (ref 8–23)
BUN/CREAT SERPL: 32.1 (ref 7–25)
CALCIUM SPEC-SCNC: 9.1 MG/DL (ref 8.6–10.5)
CHLORIDE SERPL-SCNC: 104 MMOL/L (ref 98–107)
CHOLEST SERPL-MCNC: 131 MG/DL (ref 0–200)
CO2 SERPL-SCNC: 29.7 MMOL/L (ref 22–29)
CREAT SERPL-MCNC: 0.78 MG/DL (ref 0.57–1)
GFR SERPL CREATININE-BSD FRML MDRD: 72 ML/MIN/1.73
GLOBULIN UR ELPH-MCNC: 2.3 GM/DL
GLUCOSE SERPL-MCNC: 97 MG/DL (ref 65–99)
HBA1C MFR BLD: 5.6 % (ref 3.5–5.6)
HDLC SERPL-MCNC: 51 MG/DL (ref 40–60)
LDLC SERPL CALC-MCNC: 61 MG/DL (ref 0–100)
LDLC/HDLC SERPL: 1.15 {RATIO}
POTASSIUM SERPL-SCNC: 3.9 MMOL/L (ref 3.5–5.2)
PROT SERPL-MCNC: 6.6 G/DL (ref 6–8.5)
SODIUM SERPL-SCNC: 143 MMOL/L (ref 136–145)
TRIGL SERPL-MCNC: 106 MG/DL (ref 0–150)
VLDLC SERPL-MCNC: 19 MG/DL (ref 5–40)

## 2021-12-16 PROCEDURE — 80053 COMPREHEN METABOLIC PANEL: CPT | Performed by: NURSE PRACTITIONER

## 2021-12-16 PROCEDURE — 82043 UR ALBUMIN QUANTITATIVE: CPT | Performed by: NURSE PRACTITIONER

## 2021-12-16 PROCEDURE — 83036 HEMOGLOBIN GLYCOSYLATED A1C: CPT | Performed by: NURSE PRACTITIONER

## 2021-12-16 PROCEDURE — 80061 LIPID PANEL: CPT | Performed by: NURSE PRACTITIONER

## 2021-12-16 PROCEDURE — 36415 COLL VENOUS BLD VENIPUNCTURE: CPT

## 2021-12-20 RX ORDER — METFORMIN HYDROCHLORIDE 500 MG/1
1000 TABLET, EXTENDED RELEASE ORAL
Qty: 180 TABLET | Refills: 1 | Status: SHIPPED | OUTPATIENT
Start: 2021-12-20 | End: 2022-03-15 | Stop reason: SDUPTHER

## 2022-03-15 NOTE — TELEPHONE ENCOUNTER
Caller: [place] Seema L    Relationship: Self    Best call back number: [phone]  Requested Prescriptions:   Requested Prescriptions     Pending Prescriptions Disp Refills   • metFORMIN ER (GLUCOPHAGE-XR) 500 MG 24 hr tablet 180 tablet 1     Sig: Take 2 tablets by mouth Daily With Breakfast.        Pharmacy where request should be sent:    [place] [id number]   [facility] [address]  [phone]    Does the patient have less than a 3 day supply:  [x] Yes  [] No    Josemanuel Franco Rep   03/15/22 16:00 EDT

## 2022-03-16 RX ORDER — METFORMIN HYDROCHLORIDE 500 MG/1
1000 TABLET, EXTENDED RELEASE ORAL
Qty: 180 TABLET | Refills: 1 | Status: SHIPPED | OUTPATIENT
Start: 2022-03-16 | End: 2022-12-02

## 2022-06-13 NOTE — PROGRESS NOTES
Subjective   Seema Silveira is a 76 y.o. female.       HPI   Pt. is here today for medication follow up.   1) T2DM - currently on metformin  mg 2 tabs daily.  Gets an eye exam every 4 months with Jeny.  Denies any skin lesions or sores that are not healing; denies numbness/tinlging in feet.  Last A1C was 5.6% (Dec 2021).  Denies any polyuria or polydipsia.  Doesn't check BS at home.    2) HTN-  Currently on ramipril 10 mg daily; metoprolol 50 mg 1 1/2 tabs bid; Imdur 30 mg daily.  Followed by cardiology; last visit was Dec 2021.   Denies any Cp; palpitations; SOA; dizziness; headache; trouble with vision.   3) Hyperlipidemia - currently on atorvastatin 40 mg daily. Labs due  4) Chronic back pain - currently has Norco that she uses sparingly; manages mostly with ES Tylenol.   Last fill for qty 30 of [place] was in Dec 2021.   Pain has been stable.   5) COPD - currently on Anoro inhaler; albuterol PRN.  Denies any current SOA, cough or wheezes.   6) Needs order for Cologuard.   7) Has had increase in fatigue.  Rheumatology increased Cymbalta to 60 mg daily a month ago; has felt more fatigue since this.    8) Left leg bruising/tenderness - ran into an open dresser drawer a month ago.  Caused a bruise on her left shin.  Area remains bruised and tender; maybe a slight knot.     The following portions of the patient's history were reviewed and updated as appropriate: allergies, current medications, past family history, past medical history, past social history, past surgical history and problem list.    Review of Systems   Constitutional: Positive for fatigue. Negative for chills and fever.   Eyes: Negative for blurred vision and visual disturbance.   Respiratory: Negative for cough, chest tightness, shortness of breath and wheezing.    Cardiovascular: Negative for chest pain and palpitations.   Gastrointestinal: Negative for abdominal pain, blood in stool, constipation, diarrhea, nausea, vomiting  and indigestion.   Endocrine: Negative for polydipsia, polyphagia and polyuria.   Genitourinary: Negative for difficulty urinating, dysuria, flank pain, frequency and urgency.   Musculoskeletal: Positive for arthralgias (left lower leg), back pain and myalgias.   Neurological: Negative for dizziness, weakness, numbness and headache.   Psychiatric/Behavioral: Negative for depressed mood. The patient is not nervous/anxious.        Objective   Physical Exam  Vitals reviewed.   Constitutional:       General: She is not in acute distress.     Appearance: Normal appearance. She is obese.   Cardiovascular:      Rate and Rhythm: Normal rate and regular rhythm.      Pulses: Normal pulses.      Heart sounds: Normal heart sounds. No murmur heard.  Pulmonary:      Effort: Pulmonary effort is normal. No respiratory distress.      Breath sounds: Normal breath sounds. No wheezing.   Chest:      Chest wall: No tenderness.   Abdominal:      Tenderness: There is no right CVA tenderness or left CVA tenderness.   Musculoskeletal:      Cervical back: Normal range of motion and neck supple. No tenderness.      Right lower leg: No edema.      Left lower leg: No edema.   Skin:     General: Skin is warm and dry.      Findings: Bruising (left shin; tenderness) present. No erythema or rash.   Neurological:      General: No focal deficit present.      Mental Status: She is alert and oriented to person, place, and time.   Psychiatric:         Mood and Affect: Mood normal.           Assessment & Plan   Diagnoses and all orders for this visit:    1. Essential hypertension (Primary)  Comments:  Stable.   Cont. current medication.   Labs ordered.   RTO in 6 mo.   Orders:  -     Comprehensive metabolic panel; Future  -     Lipid panel; Future  -     Hemoglobin A1c; Future    2. Type 2 diabetes mellitus with hyperglycemia, without long-term current use of insulin (HCC)  Comments:  Stable.   Cont. current medication.   Labs ordered.   RTO in 6 mo.    Orders:  -     Comprehensive metabolic panel; Future  -     Lipid panel; Future  -     Hemoglobin A1c; Future    3. Mixed hyperlipidemia  Comments:  Stable.   Cont. current medication.   Labs ordered.   RTO in 6 mo.   Orders:  -     Comprehensive metabolic panel; Future  -     Lipid panel; Future  -     Hemoglobin A1c; Future    4. Chronic bilateral low back pain without sciatica  Comments:  Stable.   Cont. current medication.   Labs ordered.   RTO in 6 mo.     5. Obstructive chronic bronchitis without exacerbation (HCC)  Comments:  Stable.   Cont. current medication.   Labs ordered.   RTO in 6 mo.   Orders:  -     Comprehensive metabolic panel; Future  -     Lipid panel; Future  -     Hemoglobin A1c; Future    6. Colon cancer screening  Comments:  Order given for Cologuard  Orders:  -     Cologuard - Stool, Per Rectum; Future    7. Fatigue, unspecified type  Comments:  Labs ordered.   Orders:  -     TSH; Future    8. Left leg pain  Comments:  Venous doppler ordered STAT  Orders:  -     Duplex Venous Lower Extremity - Left CAR; Future

## 2022-06-15 ENCOUNTER — HOSPITAL ENCOUNTER (OUTPATIENT)
Dept: CARDIOLOGY | Facility: HOSPITAL | Age: 77
Discharge: HOME | End: 2022-06-15
Admitting: NURSE PRACTITIONER

## 2022-06-15 ENCOUNTER — OFFICE VISIT (OUTPATIENT)
Dept: FAMILY MEDICINE CLINIC | Facility: CLINIC | Age: 77
End: 2022-06-15

## 2022-06-15 ENCOUNTER — LAB (OUTPATIENT)
Dept: FAMILY MEDICINE CLINIC | Facility: CLINIC | Age: 77
End: 2022-06-15

## 2022-06-15 VITALS
SYSTOLIC BLOOD PRESSURE: 133 MMHG | BODY MASS INDEX: 43.61 KG/M2 | HEART RATE: 74 BPM | OXYGEN SATURATION: 98 % | WEIGHT: 237 LBS | HEIGHT: 62 IN | DIASTOLIC BLOOD PRESSURE: 76 MMHG

## 2022-06-15 DIAGNOSIS — R53.83 FATIGUE, UNSPECIFIED TYPE: ICD-10-CM

## 2022-06-15 DIAGNOSIS — E11.65 TYPE 2 DIABETES MELLITUS WITH HYPERGLYCEMIA, WITHOUT LONG-TERM CURRENT USE OF INSULIN: ICD-10-CM

## 2022-06-15 DIAGNOSIS — J44.9 OBSTRUCTIVE CHRONIC BRONCHITIS WITHOUT EXACERBATION: ICD-10-CM

## 2022-06-15 DIAGNOSIS — Z12.11 COLON CANCER SCREENING: ICD-10-CM

## 2022-06-15 DIAGNOSIS — M79.605 LEFT LEG PAIN: ICD-10-CM

## 2022-06-15 DIAGNOSIS — M54.50 CHRONIC BILATERAL LOW BACK PAIN WITHOUT SCIATICA: ICD-10-CM

## 2022-06-15 DIAGNOSIS — G89.29 CHRONIC BILATERAL LOW BACK PAIN WITHOUT SCIATICA: ICD-10-CM

## 2022-06-15 DIAGNOSIS — E78.2 MIXED HYPERLIPIDEMIA: ICD-10-CM

## 2022-06-15 DIAGNOSIS — I10 ESSENTIAL HYPERTENSION: ICD-10-CM

## 2022-06-15 DIAGNOSIS — I10 ESSENTIAL HYPERTENSION: Primary | ICD-10-CM

## 2022-06-15 LAB
ALBUMIN SERPL-MCNC: 4.3 G/DL (ref 3.5–5.2)
ALBUMIN/GLOB SERPL: 2 G/DL
ALP SERPL-CCNC: 64 U/L (ref 39–117)
ALT SERPL W P-5'-P-CCNC: 18 U/L (ref 1–33)
ANION GAP SERPL CALCULATED.3IONS-SCNC: 11.1 MMOL/L (ref 5–15)
AST SERPL-CCNC: 18 U/L (ref 1–32)
BH CV LOWER VASCULAR LEFT COMMON FEMORAL AUGMENT: NORMAL
BH CV LOWER VASCULAR LEFT COMMON FEMORAL COMPETENT: NORMAL
BH CV LOWER VASCULAR LEFT COMMON FEMORAL COMPRESS: NORMAL
BH CV LOWER VASCULAR LEFT COMMON FEMORAL PHASIC: NORMAL
BH CV LOWER VASCULAR LEFT COMMON FEMORAL SPONT: NORMAL
BH CV LOWER VASCULAR LEFT DISTAL FEMORAL COMPRESS: NORMAL
BH CV LOWER VASCULAR LEFT GASTRONEMIUS COMPRESS: NORMAL
BH CV LOWER VASCULAR LEFT GREATER SAPH AK COMPRESS: NORMAL
BH CV LOWER VASCULAR LEFT GREATER SAPH BK COMPRESS: NORMAL
BH CV LOWER VASCULAR LEFT LESSER SAPH COMPRESS: NORMAL
BH CV LOWER VASCULAR LEFT MID FEMORAL AUGMENT: NORMAL
BH CV LOWER VASCULAR LEFT MID FEMORAL COMPETENT: NORMAL
BH CV LOWER VASCULAR LEFT MID FEMORAL COMPRESS: NORMAL
BH CV LOWER VASCULAR LEFT MID FEMORAL PHASIC: NORMAL
BH CV LOWER VASCULAR LEFT MID FEMORAL SPONT: NORMAL
BH CV LOWER VASCULAR LEFT PERONEAL COMPRESS: NORMAL
BH CV LOWER VASCULAR LEFT POPLITEAL AUGMENT: NORMAL
BH CV LOWER VASCULAR LEFT POPLITEAL COMPETENT: NORMAL
BH CV LOWER VASCULAR LEFT POPLITEAL COMPRESS: NORMAL
BH CV LOWER VASCULAR LEFT POPLITEAL PHASIC: NORMAL
BH CV LOWER VASCULAR LEFT POPLITEAL SPONT: NORMAL
BH CV LOWER VASCULAR LEFT POSTERIOR TIBIAL COMPRESS: NORMAL
BH CV LOWER VASCULAR LEFT PROXIMAL FEMORAL COMPRESS: NORMAL
BH CV LOWER VASCULAR LEFT SAPHENOFEMORAL JUNCTION COMPRESS: NORMAL
BH CV LOWER VASCULAR RIGHT COMMON FEMORAL AUGMENT: NORMAL
BH CV LOWER VASCULAR RIGHT COMMON FEMORAL COMPETENT: NORMAL
BH CV LOWER VASCULAR RIGHT COMMON FEMORAL COMPRESS: NORMAL
BH CV LOWER VASCULAR RIGHT COMMON FEMORAL PHASIC: NORMAL
BH CV LOWER VASCULAR RIGHT COMMON FEMORAL SPONT: NORMAL
BILIRUB SERPL-MCNC: 0.5 MG/DL (ref 0–1.2)
BUN SERPL-MCNC: 24 MG/DL (ref 8–23)
BUN/CREAT SERPL: 30.8 (ref 7–25)
CALCIUM SPEC-SCNC: 9.6 MG/DL (ref 8.6–10.5)
CHLORIDE SERPL-SCNC: 103 MMOL/L (ref 98–107)
CHOLEST SERPL-MCNC: 120 MG/DL (ref 0–200)
CO2 SERPL-SCNC: 25.9 MMOL/L (ref 22–29)
CREAT SERPL-MCNC: 0.78 MG/DL (ref 0.57–1)
EGFRCR SERPLBLD CKD-EPI 2021: 79.8 ML/MIN/1.73
GLOBULIN UR ELPH-MCNC: 2.2 GM/DL
GLUCOSE SERPL-MCNC: 99 MG/DL (ref 65–99)
HBA1C MFR BLD: 5.5 % (ref 3.5–5.6)
HDLC SERPL-MCNC: 55 MG/DL (ref 40–60)
LDLC SERPL CALC-MCNC: 47 MG/DL (ref 0–100)
LDLC/HDLC SERPL: 0.84 {RATIO}
MAXIMAL PREDICTED HEART RATE: 146 BPM
POTASSIUM SERPL-SCNC: 4.5 MMOL/L (ref 3.5–5.2)
PROT SERPL-MCNC: 6.5 G/DL (ref 6–8.5)
SODIUM SERPL-SCNC: 140 MMOL/L (ref 136–145)
STRESS TARGET HR: 124 BPM
TRIGL SERPL-MCNC: 95 MG/DL (ref 0–150)
TSH SERPL DL<=0.05 MIU/L-ACNC: 1.39 UIU/ML (ref 0.27–4.2)
VLDLC SERPL-MCNC: 18 MG/DL (ref 5–40)

## 2022-06-15 PROCEDURE — 93971 EXTREMITY STUDY: CPT

## 2022-06-15 PROCEDURE — 80061 LIPID PANEL: CPT | Performed by: NURSE PRACTITIONER

## 2022-06-15 PROCEDURE — 80053 COMPREHEN METABOLIC PANEL: CPT | Performed by: NURSE PRACTITIONER

## 2022-06-15 PROCEDURE — 84443 ASSAY THYROID STIM HORMONE: CPT | Performed by: NURSE PRACTITIONER

## 2022-06-15 PROCEDURE — 83036 HEMOGLOBIN GLYCOSYLATED A1C: CPT | Performed by: NURSE PRACTITIONER

## 2022-06-15 PROCEDURE — 36415 COLL VENOUS BLD VENIPUNCTURE: CPT

## 2022-06-15 PROCEDURE — 99214 OFFICE O/P EST MOD 30 MIN: CPT | Performed by: NURSE PRACTITIONER

## 2022-06-15 RX ORDER — HYDROXYCHLOROQUINE SULFATE 200 MG/1
400 TABLET, FILM COATED ORAL DAILY
COMMUNITY
Start: 2022-04-21

## 2022-06-15 RX ORDER — ASPIRIN 81 MG/1
81 TABLET, CHEWABLE ORAL DAILY
COMMUNITY
Start: 2022-04-28 | End: 2023-04-28

## 2022-06-15 RX ORDER — DULOXETIN HYDROCHLORIDE 60 MG/1
60 CAPSULE, DELAYED RELEASE ORAL DAILY
COMMUNITY
Start: 2022-05-16

## 2022-07-19 ENCOUNTER — CLINICAL SUPPORT (OUTPATIENT)
Dept: FAMILY MEDICINE CLINIC | Facility: CLINIC | Age: 77
End: 2022-07-19

## 2022-07-19 DIAGNOSIS — S41.112A LACERATION OF ARM, LEFT, INITIAL ENCOUNTER: Primary | ICD-10-CM

## 2022-07-19 PROCEDURE — 90714 TD VACC NO PRESV 7 YRS+ IM: CPT | Performed by: NURSE PRACTITIONER

## 2022-07-19 PROCEDURE — 90471 IMMUNIZATION ADMIN: CPT | Performed by: NURSE PRACTITIONER

## 2022-08-24 ENCOUNTER — TELEPHONE (OUTPATIENT)
Dept: FAMILY MEDICINE CLINIC | Facility: CLINIC | Age: 77
End: 2022-08-24

## 2022-08-24 NOTE — TELEPHONE ENCOUNTER
Caller: Seema Silveira    Relationship: Self    Best call back number: [phone]    Who are you requesting to speak with (clinical staff, provider,  specific staff member): DAVID LANDRUM OR MA    What was the call regarding: PATIENT STATES SHE TESTED POSITIVE WITH A HOME COVID TEST TODAY, 8/24/22. SHE HAS HAD A SORE THROAT, RUNNY NOSE, AND COUGH STARTING ON 8/22. REQUESTS A CALL BACK TO DISCUSS FURTHER CONCERNS, AND ANY RECOMMENDED MEDICATIONS    Do you require a callback: YES

## 2022-08-24 NOTE — TELEPHONE ENCOUNTER
I would advise that she start Vitamin C, D and zinc for her immune system.  She can use Mucinex otc for cough or congestion. Increase fluids and rest.  Tylenol or ibuprofen as needed for fever or aches.  If she would develop any trouble with breathing, she should go to ER.

## 2022-12-02 ENCOUNTER — TRANSCRIBE ORDERS (OUTPATIENT)
Dept: ADMINISTRATIVE | Facility: HOSPITAL | Age: 77
End: 2022-12-02

## 2022-12-02 DIAGNOSIS — Z12.31 VISIT FOR SCREENING MAMMOGRAM: Primary | ICD-10-CM

## 2022-12-02 RX ORDER — METFORMIN HYDROCHLORIDE 500 MG/1
1000 TABLET, EXTENDED RELEASE ORAL
Qty: 180 TABLET | Refills: 1 | Status: SHIPPED | OUTPATIENT
Start: 2022-12-02

## 2022-12-13 ENCOUNTER — HOSPITAL ENCOUNTER (OUTPATIENT)
Dept: MAMMOGRAPHY | Facility: HOSPITAL | Age: 77
Discharge: HOME | End: 2022-12-13
Admitting: NURSE PRACTITIONER

## 2022-12-13 DIAGNOSIS — Z12.31 VISIT FOR SCREENING MAMMOGRAM: ICD-10-CM

## 2022-12-13 PROCEDURE — 77067 SCR MAMMO BI INCL CAD: CPT

## 2022-12-13 PROCEDURE — 77063 BREAST TOMOSYNTHESIS BI: CPT

## 2023-05-11 RX ORDER — METFORMIN HYDROCHLORIDE 500 MG/1
1000 TABLET, EXTENDED RELEASE ORAL
Qty: 180 TABLET | Refills: 1 | OUTPATIENT
Start: 2023-05-11

## 2023-06-05 RX ORDER — METFORMIN HYDROCHLORIDE 500 MG/1
1000 TABLET, EXTENDED RELEASE ORAL
Qty: 60 TABLET | Refills: 0 | Status: SHIPPED | OUTPATIENT
Start: 2023-06-05

## 2023-07-25 RX ORDER — METFORMIN HYDROCHLORIDE 500 MG/1
1000 TABLET, EXTENDED RELEASE ORAL
Qty: 60 TABLET | Refills: 0 | Status: SHIPPED | OUTPATIENT
Start: 2023-07-25

## 2023-08-09 ENCOUNTER — OFFICE VISIT (OUTPATIENT)
Dept: FAMILY MEDICINE CLINIC | Facility: CLINIC | Age: 78
End: 2023-08-09
Payer: MEDICARE

## 2023-08-09 ENCOUNTER — LAB (OUTPATIENT)
Dept: FAMILY MEDICINE CLINIC | Facility: CLINIC | Age: 78
End: 2023-08-09
Payer: MEDICARE

## 2023-08-09 VITALS
WEIGHT: 224 LBS | OXYGEN SATURATION: 98 % | SYSTOLIC BLOOD PRESSURE: 127 MMHG | DIASTOLIC BLOOD PRESSURE: 75 MMHG | HEART RATE: 69 BPM | BODY MASS INDEX: 41.22 KG/M2 | HEIGHT: 62 IN

## 2023-08-09 DIAGNOSIS — G89.29 CHRONIC LOW BACK PAIN WITH BILATERAL SCIATICA, UNSPECIFIED BACK PAIN LATERALITY: ICD-10-CM

## 2023-08-09 DIAGNOSIS — J44.9 CHRONIC OBSTRUCTIVE PULMONARY DISEASE, UNSPECIFIED COPD TYPE: ICD-10-CM

## 2023-08-09 DIAGNOSIS — Z00.00 MEDICARE ANNUAL WELLNESS VISIT, SUBSEQUENT: Primary | ICD-10-CM

## 2023-08-09 DIAGNOSIS — I10 HYPERTENSION, UNSPECIFIED TYPE: ICD-10-CM

## 2023-08-09 DIAGNOSIS — Z78.0 POSTMENOPAUSAL STATUS: ICD-10-CM

## 2023-08-09 DIAGNOSIS — E78.2 MIXED HYPERLIPIDEMIA: ICD-10-CM

## 2023-08-09 DIAGNOSIS — E11.65 TYPE 2 DIABETES MELLITUS WITH HYPERGLYCEMIA, WITHOUT LONG-TERM CURRENT USE OF INSULIN: ICD-10-CM

## 2023-08-09 DIAGNOSIS — G89.29 CHRONIC LOW BACK PAIN, UNSPECIFIED BACK PAIN LATERALITY, UNSPECIFIED WHETHER SCIATICA PRESENT: ICD-10-CM

## 2023-08-09 DIAGNOSIS — M54.50 CHRONIC LOW BACK PAIN, UNSPECIFIED BACK PAIN LATERALITY, UNSPECIFIED WHETHER SCIATICA PRESENT: ICD-10-CM

## 2023-08-09 DIAGNOSIS — M54.41 CHRONIC LOW BACK PAIN WITH BILATERAL SCIATICA, UNSPECIFIED BACK PAIN LATERALITY: ICD-10-CM

## 2023-08-09 DIAGNOSIS — M54.42 CHRONIC LOW BACK PAIN WITH BILATERAL SCIATICA, UNSPECIFIED BACK PAIN LATERALITY: ICD-10-CM

## 2023-08-09 DIAGNOSIS — J44.9 OBSTRUCTIVE CHRONIC BRONCHITIS WITHOUT EXACERBATION: ICD-10-CM

## 2023-08-09 LAB
ALBUMIN SERPL-MCNC: 4.4 G/DL (ref 3.5–5.2)
ALBUMIN UR-MCNC: 1.9 MG/DL
ALBUMIN/GLOB SERPL: 1.6 G/DL
ALP SERPL-CCNC: 57 U/L (ref 39–117)
ALT SERPL W P-5'-P-CCNC: 20 U/L (ref 1–33)
ANION GAP SERPL CALCULATED.3IONS-SCNC: 9.3 MMOL/L (ref 5–15)
AST SERPL-CCNC: 19 U/L (ref 1–32)
BILIRUB SERPL-MCNC: 1 MG/DL (ref 0–1.2)
BUN SERPL-MCNC: 19 MG/DL (ref 8–23)
BUN/CREAT SERPL: 21.6 (ref 7–25)
CALCIUM SPEC-SCNC: 10.1 MG/DL (ref 8.6–10.5)
CHLORIDE SERPL-SCNC: 105 MMOL/L (ref 98–107)
CHOLEST SERPL-MCNC: 210 MG/DL (ref 0–200)
CO2 SERPL-SCNC: 23.7 MMOL/L (ref 22–29)
CREAT SERPL-MCNC: 0.88 MG/DL (ref 0.57–1)
CREAT UR-MCNC: 138.7 MG/DL
EGFRCR SERPLBLD CKD-EPI 2021: 68.6 ML/MIN/1.73
GLOBULIN UR ELPH-MCNC: 2.7 GM/DL
GLUCOSE SERPL-MCNC: 100 MG/DL (ref 65–99)
HBA1C MFR BLD: 5.2 % (ref 4.8–5.6)
HDLC SERPL-MCNC: 55 MG/DL (ref 40–60)
LDLC SERPL CALC-MCNC: 127 MG/DL (ref 0–100)
LDLC/HDLC SERPL: 2.23 {RATIO}
MICROALBUMIN/CREAT UR: 13.7 MG/G
POTASSIUM SERPL-SCNC: 4.4 MMOL/L (ref 3.5–5.2)
PROT SERPL-MCNC: 7.1 G/DL (ref 6–8.5)
SODIUM SERPL-SCNC: 138 MMOL/L (ref 136–145)
TRIGL SERPL-MCNC: 161 MG/DL (ref 0–150)
VLDLC SERPL-MCNC: 28 MG/DL (ref 5–40)

## 2023-08-09 PROCEDURE — 36415 COLL VENOUS BLD VENIPUNCTURE: CPT

## 2023-08-09 PROCEDURE — 82570 ASSAY OF URINE CREATININE: CPT | Performed by: NURSE PRACTITIONER

## 2023-08-09 PROCEDURE — 80061 LIPID PANEL: CPT | Performed by: NURSE PRACTITIONER

## 2023-08-09 PROCEDURE — 82043 UR ALBUMIN QUANTITATIVE: CPT | Performed by: NURSE PRACTITIONER

## 2023-08-09 PROCEDURE — 80053 COMPREHEN METABOLIC PANEL: CPT | Performed by: NURSE PRACTITIONER

## 2023-08-09 PROCEDURE — 83036 HEMOGLOBIN GLYCOSYLATED A1C: CPT | Performed by: NURSE PRACTITIONER

## 2023-08-09 RX ORDER — DULOXETIN HYDROCHLORIDE 30 MG/1
1 CAPSULE, DELAYED RELEASE ORAL DAILY
COMMUNITY
Start: 2023-07-10

## 2023-08-09 RX ORDER — METOPROLOL SUCCINATE 50 MG/1
75 TABLET, EXTENDED RELEASE ORAL DAILY
COMMUNITY
Start: 2023-06-28

## 2023-08-09 RX ORDER — HYDROCODONE BITARTRATE AND ACETAMINOPHEN 5; 325 MG/1; MG/1
1 TABLET ORAL EVERY 8 HOURS PRN
Qty: 30 TABLET | Refills: 0 | Status: SHIPPED | OUTPATIENT
Start: 2023-08-09

## 2023-08-09 RX ORDER — MOXIFLOXACIN 5 MG/ML
SOLUTION/ DROPS OPHTHALMIC
COMMUNITY
Start: 2023-05-23

## 2023-08-09 RX ORDER — METFORMIN HYDROCHLORIDE 500 MG/1
1000 TABLET, EXTENDED RELEASE ORAL
Qty: 180 TABLET | Refills: 3 | Status: SHIPPED | OUTPATIENT
Start: 2023-08-09

## 2023-08-09 NOTE — PROGRESS NOTES
The ABCs of the Annual Wellness Visit  Subsequent Medicare Wellness Visit    Chief Complaint   Patient presents with    Medicare Wellness-subsequent      Subjective    History of Present Illness:  Seema Silveira is a 77 y.o. female who presents for a Subsequent Medicare Wellness Visit.  MMSE score is 30/30 today.   Last pap - has had hysterectomy.   Mammogram - Dec 2022.   DEXA scan -2019  Colon screening - Cologuard June 2022.   Immunizations reviewed.   May be leaving first of Oct. For [place] for a few months.     Pt is also here today for 6 month follow up on chronic medical conditions.   1) T2DM - currently on metformin  mg 2 tabs daily.  Gets an eye exam every 4 months with Jeny; has had recent eye surgery.  Denies any skin lesions or sores that are not healing; denies numbness/tinlging in feet.  Last A1C was 5.5% (June 2022).  Denies any polyuria or polydipsia.  Doesn't check BS at home.    2) HTN-  Currently on ramipril 10 mg daily; metoprolol XL 75  mg 1 daily; Imdur 30 mg daily.  Followed by cardiology; last visit was May 2023.   Denies any Cp; palpitations; SOA; dizziness; headache; trouble with vision.   3) Hyperlipidemia - was on atorvastatin 40 mg daily. Hasn't been taking and not sure how long.   4) Chronic back pain - currently has Norco that she uses sparingly; manages mostly with ES Tylenol.   Last fill for qty 30 of [place] was in Dec 2021.   Pain has been stable.  She does need refill.  Requests [employer] form.   5) COPD - currently on Anoro inhaler; albuterol PRN.  Denies any current SOA, cough or wheezes.     The following portions of the patient's history were reviewed and   updated as appropriate: allergies, current medications, past family history, past medical history, past social history, past surgical history, and problem list.    Compared to one year ago, the patient feels her physical   health is worse.    Compared to one year ago, the patient feels her  mental   health is the same.    Recent Hospitalizations:  She was not admitted to the hospital during the last year.       Current Medical Providers:  Patient Care Team:  Bridgette Cabrera APRN as PCP - General (Nurse Practitioner)    Outpatient Medications Prior to Visit   Medication Sig Dispense Refill    DULoxetine (CYMBALTA) 30 MG capsule Take 1 capsule by mouth Daily.      metoprolol succinate XL (TOPROL-XL) 50 MG 24 hr tablet Take 1.5 tablets by mouth Daily.      moxifloxacin (VIGAMOX) 0.5 % ophthalmic solution INSTILL 1 DROP INTO RIGHT EYE 3 TIMES A DAY      metFORMIN ER (GLUCOPHAGE-XR) 500 MG 24 hr tablet TAKE 2 TABLETS BY MOUTH DAILY WITH BREAKFAST. DUE OFFICE FOLLOW UP NO FURTHER REFILL 60 tablet 0    albuterol sulfate  (90 Base) MCG/ACT inhaler Inhale 2 puffs Every 4 (Four) Hours As Needed for Wheezing. 1 inhaler 5    Blood Glucose Monitoring Suppl (FREESTYLE FREEDOM LITE) w/Device kit [employer] LITE w/Device KIT      Calcium Carb-Cholecalciferol (CALCIUM-VITAMIN D) 600-400 MG-UNIT tablet Take 2 tablets by mouth Daily. 180 tablet 1    Calcium Carbonate 1500 (600 Ca) MG tablet Take 1 tablet by mouth Daily.      cholecalciferol (Cholecalciferol) 25 MCG (1000 UT) tablet Take 1 tablet by mouth Daily.      clopidogrel (PLAVIX) 75 MG tablet Take 75 mg by mouth Daily.  0    dorzolamide-timolol (COSOPT) 22.3-6.8 MG/ML ophthalmic solution INSTILL 1 DROP INTO BOTH EYES TWICE A DAY      furosemide (LASIX) 40 MG tablet       glucose blood (FREESTYLE LITE) test strip Use to test blood sugar once daily E11.65 100 each 12    hydrocortisone 2.5 % ointment       hydroxychloroquine (PLAQUENIL) 200 MG tablet Take 400 mg by mouth Daily.      isosorbide mononitrate (IMDUR) 30 MG 24 hr tablet Take 30 mg by mouth Daily.      Lancets (FREESTYLE) lancets FREESTYLE LANCETS      multivitamin with minerals (Multi For Her 50+) tablet tablet Take 1 tablet by mouth Daily.      nitroglycerin (NITROSTAT) 0.4 MG SL tablet        ramipril (ALTACE) 10 MG capsule Take 10 mg by mouth Daily.      Vitamin E 400 units tablet Take 1 tablet by mouth Daily.      VYZULTA 0.024 % solution Administer 1 drop to both eyes every night at bedtime.  1    atorvastatin (LIPITOR) 40 MG tablet Take 40 mg by mouth Daily.  0    DULoxetine (CYMBALTA) 60 MG capsule Take 60 mg by mouth Daily.      furosemide (LASIX) 20 MG tablet FUROSEMIDE 20 MG TABS      HYDROcodone-acetaminophen (Norco) 5-325 MG per tablet Take 1 tablet by mouth Every 8 (Eight) Hours As Needed for Moderate Pain  or Severe Pain . 30 tablet 0    metoprolol tartrate (LOPRESSOR) 50 MG tablet TAKE 1 AND 1/2 TABLET BY MOUTH TWICE A DAY AS DIRECTED  0    umeclidinium-vilanterol (Anoro Ellipta) 62.5-25 MCG/INH aerosol powder  inhaler Inhale 1 puff Daily. 1 each 5     No facility-administered medications prior to visit.       Opioid medication/s are on active medication list.  and I have evaluated her active treatment plan and pain score trends (see table).  There were no vitals filed for this visit.  I have reviewed the chart for potential of high risk medication and harmful drug interactions in the elderly.          Aspirin is not on active medication list.  Aspirin use is not indicated based on review of current medical condition/s. Risk of harm outweighs potential benefits.  .    Patient Active Problem List   Diagnosis    Atherosclerosis of native arteries of extremities with intermittent claudication, bilateral legs    Chronic coronary artery disease    Chronic low back pain    Chronic obstructive pulmonary disease    Congestive heart failure    Constipation, chronic    Degeneration of intervertebral disc of lumbar region    Elevated antinuclear antibody (RENAE) level    Elevation of level of transaminase or lactic acid dehydrogenase (LDH)    Encounter for general adult medical examination without abnormal findings    Visit for screening mammogram    Gastroesophageal reflux disease    Glaucoma     "Hyperlipidemia    Hypertension    Lipoma    Lumbar radiculopathy, right    Muscle ache    Need for other prophylactic vaccination and inoculation against single diseases    Obesity, morbid    Inflammatory arthritis    Osteoarthritis    Osteopenia    Pain in joint of left shoulder    Paroxysmal atrial fibrillation    Postmenopausal    Presence of cardiac pacemaker    Restless leg syndrome    Sacroiliitis, not elsewhere classified    Sick sinus syndrome    Sleep apnea    Spondylosis of lumbosacral region    Status post percutaneous transluminal coronary angioplasty    Trochanteric bursitis of left hip    Type 2 diabetes mellitus     Advance Care Planning  Advance Directive is on file.  ACP discussion was held with the patient during this visit. Patient has an advance directive in EMR which is still valid.     Review of Systems   Constitutional:  Negative for chills, fatigue and fever.   Respiratory:  Negative for cough, shortness of breath and wheezing.    Cardiovascular:  Negative for chest pain and palpitations.   Gastrointestinal:  Negative for abdominal pain, blood in stool, constipation, diarrhea and vomiting.   Endocrine: Negative for polydipsia, polyphagia and polyuria.   Genitourinary:  Negative for dysuria, flank pain, frequency, hematuria and urgency.   Musculoskeletal:  Positive for arthralgias and back pain.   Neurological:  Negative for dizziness, weakness and light-headedness.   Psychiatric/Behavioral:  Negative for dysphoric mood. The patient is not nervous/anxious.       Objective    Vitals:    08/09/23 0955   BP: 127/75   BP Location: Left arm   Patient Position: Sitting   Cuff Size: Large Adult   Pulse: 69   SpO2: 98%   Weight: 102 kg (224 lb)   Height: 157.5 cm (62\")     Estimated body mass index is 40.97 kg/mý as calculated from the following:    Height as of this encounter: 157.5 cm (62\").    Weight as of this encounter: 102 kg (224 lb).    Class 3 Severe Obesity (BMI >=40). Obesity-related health " conditions include the following: hypertension, coronary heart disease, diabetes mellitus, and dyslipidemias. Obesity is unchanged. BMI is is above average; BMI management plan is completed. We discussed portion control and increasing exercise.      Does the patient have evidence of cognitive impairment? No    Physical Exam  Vitals reviewed.   Constitutional:       General: She is not in acute distress.     Appearance: Normal appearance. She is obese.   Cardiovascular:      Rate and Rhythm: Normal rate and regular rhythm.      Pulses: Normal pulses.      Heart sounds: Normal heart sounds. No murmur heard.  Pulmonary:      Effort: Pulmonary effort is normal. No respiratory distress.      Breath sounds: Normal breath sounds. No wheezing or rhonchi.   Chest:      Chest wall: No tenderness.   Abdominal:      Tenderness: There is no right CVA tenderness or left CVA tenderness.   Musculoskeletal:      Right lower leg: Edema present.      Left lower leg: Edema present.   Skin:     General: Skin is warm and dry.      Findings: No erythema.   Neurological:      General: No focal deficit present.      Mental Status: She is alert and oriented to person, place, and time.   Psychiatric:         Mood and Affect: Mood normal.               HEALTH RISK ASSESSMENT    Smoking Status:  Social History     Tobacco Use   Smoking Status Never   Smokeless Tobacco Never     Alcohol Consumption:  Social History     Substance and Sexual Activity   Alcohol Use No     Fall Risk Screen:    STEADI Fall Risk Assessment was completed, and patient is at LOW risk for falls.Assessment completed on:2023    Depression Screenin/9/2023    10:02 AM   PHQ-2/PHQ-9 Depression Screening   Little Interest or Pleasure in Doing Things 1-->several days   Feeling Down, Depressed or Hopeless 0-->not at all   PHQ-9: Brief Depression Severity Measure Score 1       Health Habits and Functional and Cognitive Screenin/9/2023    10:00 AM   Functional  & Cognitive Status   Do you have difficulty preparing food and eating? No   Do you have difficulty bathing yourself, getting dressed or grooming yourself? No   Do you have difficulty using the toilet? No   Do you have difficulty moving around from place to place? Yes   Do you have trouble with steps or getting out of a bed or a chair? No   Current Diet Unhealthy Diet   Dental Exam Up to date   Eye Exam Up to date   Current Exercises Include Yard Work   Do you need help using the phone?  No   Are you deaf or do you have serious difficulty hearing?  No   Do you need help to go to places out of walking distance? No   Do you need help shopping? No   Do you need help preparing meals?  No   Do you need help with housework?  No   Do you need help with laundry? No   Do you need help taking your medications? No   Do you need help managing money? No   Do you ever drive or ride in a car without wearing a seat belt? No   Have you felt unusual stress, anger or loneliness in the last month? Yes   Who do you live with? Alone   If you need help, do you have trouble finding someone available to you? No   Have you been bothered in the last four weeks by sexual problems? No   Do you have difficulty concentrating, remembering or making decisions? No       Age-appropriate Screening Schedule:  Refer to the list below for future screening recommendations based on patient's age, sex and/or medical conditions. Orders for these recommended tests are listed in the plan section. The patient has been provided with a written plan.    Health Maintenance   Topic Date Due    ZOSTER VACCINE (1 of 2) Never done    DXA SCAN  05/11/2021    COVID-19 Vaccine (4 - Moderna series) 12/20/2021    HEMOGLOBIN A1C  12/15/2022    URINE MICROALBUMIN  12/16/2022    LIPID PANEL  06/15/2023    DIABETIC FOOT EXAM  08/24/2023 (Originally 9/11/2019)    INFLUENZA VACCINE  10/01/2023    DIABETIC EYE EXAM  01/02/2024    ANNUAL WELLNESS VISIT  08/09/2024    COLORECTAL  CANCER SCREENING  06/28/2025    TDAP/TD VACCINES (2 - Tdap) 07/19/2032    HEPATITIS C SCREENING  Completed    Pneumococcal Vaccine 65+  Completed              Assessment & Plan   CMS Preventative Services Quick Reference  Risk Factors Identified During Encounter  Fall Risk-High or Moderate: Discussed Fall Prevention in the home  Immunizations Discussed/Encouraged: Influenza  Inactivity/Sedentary: Patient was advised to exercise at least 150 minutes a week per CDC recommendations. and Patient was advised to do at least 150 minutes a week of moderate intensity activity such as brisk walking and do at least 2 days a week of activities that strengthen muscles such as resistance training and do activities to improve balance such as standing on one foot about 3 days a week.  The above risks/problems have been discussed with the patient.  Follow up actions/plans if indicated are seen below in the Assessment/Plan Section.  Pertinent information has been shared with the patient in the After Visit Summary.    Diagnoses and all orders for this visit:    1. Medicare annual wellness visit, subsequent (Primary)    2. Hypertension, unspecified type  -     Comprehensive metabolic panel; Future  -     Lipid panel; Future  -     Hemoglobin A1c; Future  -     Microalbumin / Creatinine Urine Ratio - Urine, Clean Catch; Future    3. Type 2 diabetes mellitus with hyperglycemia, without long-term current use of insulin  -     Comprehensive metabolic panel; Future  -     Lipid panel; Future  -     Hemoglobin A1c; Future  -     Microalbumin / Creatinine Urine Ratio - Urine, Clean Catch; Future  -     metFORMIN ER (GLUCOPHAGE-XR) 500 MG 24 hr tablet; Take 2 tablets by mouth Daily With Breakfast.  Dispense: 180 tablet; Refill: 3    4. Mixed hyperlipidemia  -     Comprehensive metabolic panel; Future  -     Lipid panel; Future  -     Hemoglobin A1c; Future  -     Microalbumin / Creatinine Urine Ratio - Urine, Clean Catch; Future    5. Chronic  low back pain with bilateral sciatica, unspecified back pain laterality  -     HYDROcodone-acetaminophen (Norco) 5-325 MG per tablet; Take 1 tablet by mouth Every 8 (Eight) Hours As Needed for Moderate Pain or Severe Pain.  Dispense: 30 tablet; Refill: 0    6. Obstructive chronic bronchitis without exacerbation  -     Umeclidinium-Vilanterol (Anoro Ellipta) 62.5-25 MCG/ACT aerosol powder  inhaler; Inhale 1 puff Daily.  Dispense: 1 each; Refill: 11    7. Chronic obstructive pulmonary disease, unspecified COPD type  Comments:  Stable.   Cont. current medications.   Labs today.     8. Postmenopausal status  -     DEXA Bone Density Axial    9. Chronic low back pain, unspecified back pain laterality, unspecified whether sciatica present  Comments:  Stable.   INSPECT reviewed.   Cont. current medication.   Labs ordered.   RTO in 6 mo.         Follow Up:   Return in about 6 months (around 2/9/2024).     An After Visit Summary and PPPS were made available to the patient.      I spent 30 minutes caring for Seema on this date of service. This time includes time spent by me in the following activities:preparing for the visit, reviewing tests, obtaining and/or reviewing a separately obtained history, performing a medically appropriate examination and/or evaluation , counseling and educating the patient/family/caregiver, ordering medications, tests, or procedures, referring and communicating with other health care professionals , documenting information in the medical record, independently interpreting results and communicating that information with the patient/family/caregiver, and care coordination

## 2023-08-10 DIAGNOSIS — E78.2 MIXED HYPERLIPIDEMIA: Primary | ICD-10-CM

## 2023-08-10 RX ORDER — ATORVASTATIN CALCIUM 20 MG/1
20 TABLET, FILM COATED ORAL DAILY
Qty: 90 TABLET | Refills: 3 | Status: SHIPPED | OUTPATIENT
Start: 2023-08-10

## 2023-08-11 ENCOUNTER — TELEPHONE (OUTPATIENT)
Dept: FAMILY MEDICINE CLINIC | Facility: CLINIC | Age: 78
End: 2023-08-11
Payer: MEDICARE

## 2023-08-11 NOTE — TELEPHONE ENCOUNTER
Prior Auth completed for HYDROcodone-Acetaminophen 5-325MG tablets via covermymeds. Pending determination.    Key: [id number] - PA Case ID: [id number]   Your head scan looked normal.  You have a very small fracture of your right hand, just behind your knuckle on your pinky finger. You will likely be sore for several days, but you should recovery fully within about 1 week. Use the wrist brace and take tylenol as needed for pain.

## 2023-08-11 NOTE — TELEPHONE ENCOUNTER
Prior Authorization for HYDROcodone-Acetaminophen 5-325MG tablets approved. Approved from 01/01/2023 - 08/10/2024,         Faxed approval to pharmacy.

## 2023-08-21 ENCOUNTER — HOSPITAL ENCOUNTER (OUTPATIENT)
Dept: BONE DENSITY | Facility: HOSPITAL | Age: 78
Discharge: HOME | End: 2023-08-21
Admitting: NURSE PRACTITIONER
Payer: MEDICARE

## 2023-08-21 PROCEDURE — 77080 DXA BONE DENSITY AXIAL: CPT

## 2023-08-24 ENCOUNTER — TELEPHONE (OUTPATIENT)
Dept: FAMILY MEDICINE CLINIC | Facility: CLINIC | Age: 78
End: 2023-08-24
Payer: MEDICARE

## 2023-08-24 NOTE — TELEPHONE ENCOUNTER
Pt just now getting PA for Oxycodone, has not yet had filled.  Rheumatologist feels that Tramadol may be better.  Can we send the Tramadol to [facility] in [place], and cancel Oxycodone please.

## 2023-08-24 NOTE — TELEPHONE ENCOUNTER
I don't prescribe her oxycodone; her script was for hydrocodone (norco).  The PA was approved. If Rheumatology wants to switch the pain medication they need to be the one to prescribe it.

## 2024-04-09 ENCOUNTER — TRANSCRIBE ORDERS (OUTPATIENT)
Dept: ADMINISTRATIVE | Facility: HOSPITAL | Age: 79
End: 2024-04-09
Payer: MEDICARE

## 2024-04-09 DIAGNOSIS — Z12.31 VISIT FOR SCREENING MAMMOGRAM: Primary | ICD-10-CM

## 2024-04-15 ENCOUNTER — HOSPITAL ENCOUNTER (OUTPATIENT)
Dept: MAMMOGRAPHY | Facility: HOSPITAL | Age: 79
Discharge: HOME | End: 2024-04-15
Admitting: NURSE PRACTITIONER
Payer: MEDICARE

## 2024-04-15 DIAGNOSIS — Z12.31 VISIT FOR SCREENING MAMMOGRAM: ICD-10-CM

## 2024-04-15 PROCEDURE — 77063 BREAST TOMOSYNTHESIS BI: CPT

## 2024-04-15 PROCEDURE — 77067 SCR MAMMO BI INCL CAD: CPT

## 2024-05-26 DIAGNOSIS — E78.2 MIXED HYPERLIPIDEMIA: ICD-10-CM

## 2024-05-28 RX ORDER — ATORVASTATIN CALCIUM 20 MG/1
20 TABLET, FILM COATED ORAL DAILY
Qty: 90 TABLET | Refills: 3 | Status: SHIPPED | OUTPATIENT
Start: 2024-05-28

## 2024-07-09 DIAGNOSIS — E11.65 TYPE 2 DIABETES MELLITUS WITH HYPERGLYCEMIA, WITHOUT LONG-TERM CURRENT USE OF INSULIN: ICD-10-CM

## 2024-07-10 RX ORDER — METFORMIN HYDROCHLORIDE 500 MG/1
1000 TABLET, EXTENDED RELEASE ORAL
Qty: 180 TABLET | Refills: 0 | Status: SHIPPED | OUTPATIENT
Start: 2024-07-10

## 2024-12-04 DIAGNOSIS — E11.65 TYPE 2 DIABETES MELLITUS WITH HYPERGLYCEMIA, WITHOUT LONG-TERM CURRENT USE OF INSULIN: ICD-10-CM

## 2024-12-05 RX ORDER — METFORMIN HYDROCHLORIDE 500 MG/1
1000 TABLET, EXTENDED RELEASE ORAL
Qty: 180 TABLET | Refills: 0 | OUTPATIENT
Start: 2024-12-05

## 2024-12-09 NOTE — PROGRESS NOTES
Subjective   Seema Silveira is a 79 y.o. female.       HPI   Pt is here today for follow up on chronic medical conditions. Will be going back to [place] after the holidays for the winter.    1) T2DM - currently on metformin  mg 2 tabs daily; Jardiance 10 mg daily.  Gets an eye exam every 4 months with Jeny.  Denies any skin lesions or sores that are not healing; denies numbness/tinlging in feet.  Last A1C was 5.2% (Aug 2023).  Denies any polyuria or polydipsia.  Doesn't check BS at home.  Current weight is 228' BMI 41.    2) HTN/pacemaker-  Currently on ramipril 10 mg daily; metoprolol XL 75  mg 1 daily; Imdur 30 mg daily; spironolactone 12.5 mg daily.  Followed by cardiology at [facility]; has appt next week.  Denies any Cp; palpitations; SOA; dizziness; headache; trouble with vision.   3) Hyperlipidemia - currently on atorvastatin 20 mg daily.    4) Chronic back pain - currently has Norco that she uses sparingly; manages mostly with ES Tylenol.  Last fill for qty 30 of [place] was in Aug 2023.  Pain has been stable.    5) COPD - currently on Anoro inhaler; albuterol PRN.  Denies any current SOA, cough or wheezes.     The following portions of the patient's history were reviewed and updated as appropriate: allergies, current medications, past family history, past medical history, past social history, past surgical history, and problem list.    Review of Systems   Constitutional:  Negative for chills, fatigue and fever.   Eyes:  Negative for visual disturbance.   Respiratory:  Negative for cough, shortness of breath and wheezing.    Cardiovascular:  Negative for chest pain and palpitations.   Gastrointestinal:  Negative for abdominal pain, blood in stool, constipation, diarrhea, nausea, vomiting and GERD.   Endocrine: Negative for polydipsia, polyphagia and polyuria.   Genitourinary:  Negative for dysuria, frequency, hematuria and urgency.   Neurological:  Negative for dizziness, weakness,  light-headedness and headache.   Psychiatric/Behavioral:  Negative for depressed mood. The patient is not nervous/anxious.        Objective   Physical Exam  Vitals reviewed.   Constitutional:       General: She is not in acute distress.     Appearance: Normal appearance. She is obese.   Cardiovascular:      Rate and Rhythm: Normal rate and regular rhythm.      Pulses: Normal pulses.      Heart sounds: Normal heart sounds.   Pulmonary:      Effort: Pulmonary effort is normal. No respiratory distress.      Breath sounds: Normal breath sounds. No wheezing or rhonchi.   Chest:      Chest wall: No tenderness.   Abdominal:      Tenderness: There is no right CVA tenderness or left CVA tenderness.   Skin:     General: Skin is warm and dry.      Findings: Bruising (on forearms) present.   Neurological:      Mental Status: She is alert and oriented to person, place, and time. Mental status is at baseline.   Psychiatric:         Mood and Affect: Mood normal.         Class 3 Severe Obesity (BMI >=40). Obesity-related health conditions include the following: hypertension, diabetes mellitus, and dyslipidemias. Obesity is unchanged. BMI is is above average; BMI management plan is completed. We discussed Information on healthy weight added to patient's after visit summary.       Procedures   Assessment & Plan   Diagnoses and all orders for this visit:    1. Essential hypertension (Primary)  Comments:  Stable  Cont. current medication.   Labs ordered.   RTO in 6 mo.   Keep f/u with cardiology.  Orders:  -     Comprehensive metabolic panel; Future  -     Lipid panel; Future  -     Hemoglobin A1c; Future    2. Type 2 diabetes mellitus with hyperglycemia, without long-term current use of insulin  Comments:  Stable  Cont. current medication.   Labs ordered.   RTO in 6 mo.  Orders:  -     Comprehensive metabolic panel; Future  -     Lipid panel; Future  -     Hemoglobin A1c; Future  -     metFORMIN ER (GLUCOPHAGE-XR) 500 MG 24 hr tablet;  Take 2 tablets by mouth Daily With Breakfast.  Dispense: 180 tablet; Refill: 3    3. Mixed hyperlipidemia  Comments:  Stable  Cont. current medication.   Labs ordered.   RTO in 6 mo.  Orders:  -     Comprehensive metabolic panel; Future  -     Lipid panel; Future  -     Hemoglobin A1c; Future    4. Chronic low back pain with bilateral sciatica, unspecified back pain laterality  Comments:  Stable  Cont. current medication. Refill sent  Labs ordered.   RTO in 6 mo.  Orders:  -     HYDROcodone-acetaminophen (Norco) 5-325 MG per tablet; Take 1 tablet by mouth Every 8 (Eight) Hours As Needed for Moderate Pain or Severe Pain.  Dispense: 30 tablet; Refill: 0    5. Obstructive chronic bronchitis without exacerbation  Comments:  Stable  Cont. current medication.   Labs ordered.   RTO in 6 mo.    6. Class 3 severe obesity due to excess calories with serious comorbidity and body mass index (BMI) of 40.0 to 44.9 in adult  Comments:  Work on a healthy diet; aim for 150 min exercise weekly.

## 2024-12-10 ENCOUNTER — LAB (OUTPATIENT)
Dept: FAMILY MEDICINE CLINIC | Facility: CLINIC | Age: 79
End: 2024-12-10
Payer: MEDICARE

## 2024-12-10 ENCOUNTER — OFFICE VISIT (OUTPATIENT)
Dept: FAMILY MEDICINE CLINIC | Facility: CLINIC | Age: 79
End: 2024-12-10
Payer: MEDICARE

## 2024-12-10 VITALS
HEIGHT: 62 IN | HEART RATE: 63 BPM | WEIGHT: 228 LBS | SYSTOLIC BLOOD PRESSURE: 139 MMHG | DIASTOLIC BLOOD PRESSURE: 79 MMHG | OXYGEN SATURATION: 99 % | TEMPERATURE: 97.7 F | BODY MASS INDEX: 41.96 KG/M2

## 2024-12-10 DIAGNOSIS — E11.65 TYPE 2 DIABETES MELLITUS WITH HYPERGLYCEMIA, WITHOUT LONG-TERM CURRENT USE OF INSULIN: ICD-10-CM

## 2024-12-10 DIAGNOSIS — E66.01 CLASS 3 SEVERE OBESITY DUE TO EXCESS CALORIES WITH SERIOUS COMORBIDITY AND BODY MASS INDEX (BMI) OF 40.0 TO 44.9 IN ADULT: ICD-10-CM

## 2024-12-10 DIAGNOSIS — J44.89 OBSTRUCTIVE CHRONIC BRONCHITIS WITHOUT EXACERBATION: ICD-10-CM

## 2024-12-10 DIAGNOSIS — E66.813 CLASS 3 SEVERE OBESITY DUE TO EXCESS CALORIES WITH SERIOUS COMORBIDITY AND BODY MASS INDEX (BMI) OF 40.0 TO 44.9 IN ADULT: ICD-10-CM

## 2024-12-10 DIAGNOSIS — I10 ESSENTIAL HYPERTENSION: ICD-10-CM

## 2024-12-10 DIAGNOSIS — I10 ESSENTIAL HYPERTENSION: Primary | ICD-10-CM

## 2024-12-10 DIAGNOSIS — M54.41 CHRONIC LOW BACK PAIN WITH BILATERAL SCIATICA, UNSPECIFIED BACK PAIN LATERALITY: ICD-10-CM

## 2024-12-10 DIAGNOSIS — E78.2 MIXED HYPERLIPIDEMIA: ICD-10-CM

## 2024-12-10 DIAGNOSIS — M54.42 CHRONIC LOW BACK PAIN WITH BILATERAL SCIATICA, UNSPECIFIED BACK PAIN LATERALITY: ICD-10-CM

## 2024-12-10 DIAGNOSIS — G89.29 CHRONIC LOW BACK PAIN WITH BILATERAL SCIATICA, UNSPECIFIED BACK PAIN LATERALITY: ICD-10-CM

## 2024-12-10 LAB
ALBUMIN SERPL-MCNC: 4.2 G/DL (ref 3.5–5.2)
ALBUMIN/GLOB SERPL: 1.6 G/DL
ALP SERPL-CCNC: 54 U/L (ref 39–117)
ALT SERPL W P-5'-P-CCNC: 18 U/L (ref 1–33)
ANION GAP SERPL CALCULATED.3IONS-SCNC: 10.8 MMOL/L (ref 5–15)
AST SERPL-CCNC: 23 U/L (ref 1–32)
BILIRUB SERPL-MCNC: 0.6 MG/DL (ref 0–1.2)
BUN SERPL-MCNC: 17 MG/DL (ref 8–23)
BUN/CREAT SERPL: 19.5 (ref 7–25)
CALCIUM SPEC-SCNC: 10.1 MG/DL (ref 8.6–10.5)
CHLORIDE SERPL-SCNC: 103 MMOL/L (ref 98–107)
CHOLEST SERPL-MCNC: 145 MG/DL (ref 0–200)
CO2 SERPL-SCNC: 28.2 MMOL/L (ref 22–29)
CREAT SERPL-MCNC: 0.87 MG/DL (ref 0.57–1)
EGFRCR SERPLBLD CKD-EPI 2021: 68.7 ML/MIN/1.73
GLOBULIN UR ELPH-MCNC: 2.7 GM/DL
GLUCOSE SERPL-MCNC: 109 MG/DL (ref 65–99)
HBA1C MFR BLD: 5.4 % (ref 4.8–5.6)
HDLC SERPL-MCNC: 57 MG/DL (ref 40–60)
LDLC SERPL CALC-MCNC: 69 MG/DL (ref 0–100)
LDLC/HDLC SERPL: 1.18 {RATIO}
POTASSIUM SERPL-SCNC: 4.3 MMOL/L (ref 3.5–5.2)
PROT SERPL-MCNC: 6.9 G/DL (ref 6–8.5)
SODIUM SERPL-SCNC: 142 MMOL/L (ref 136–145)
TRIGL SERPL-MCNC: 104 MG/DL (ref 0–150)
VLDLC SERPL-MCNC: 19 MG/DL (ref 5–40)

## 2024-12-10 PROCEDURE — 80053 COMPREHEN METABOLIC PANEL: CPT | Performed by: NURSE PRACTITIONER

## 2024-12-10 PROCEDURE — 99214 OFFICE O/P EST MOD 30 MIN: CPT | Performed by: NURSE PRACTITIONER

## 2024-12-10 PROCEDURE — 1125F AMNT PAIN NOTED PAIN PRSNT: CPT | Performed by: NURSE PRACTITIONER

## 2024-12-10 PROCEDURE — 83036 HEMOGLOBIN GLYCOSYLATED A1C: CPT | Performed by: NURSE PRACTITIONER

## 2024-12-10 PROCEDURE — 1159F MED LIST DOCD IN RCRD: CPT | Performed by: NURSE PRACTITIONER

## 2024-12-10 PROCEDURE — G2211 COMPLEX E/M VISIT ADD ON: HCPCS | Performed by: NURSE PRACTITIONER

## 2024-12-10 PROCEDURE — 80061 LIPID PANEL: CPT | Performed by: NURSE PRACTITIONER

## 2024-12-10 PROCEDURE — 3078F DIAST BP <80 MM HG: CPT | Performed by: NURSE PRACTITIONER

## 2024-12-10 PROCEDURE — 3075F SYST BP GE 130 - 139MM HG: CPT | Performed by: NURSE PRACTITIONER

## 2024-12-10 PROCEDURE — 1160F RVW MEDS BY RX/DR IN RCRD: CPT | Performed by: NURSE PRACTITIONER

## 2024-12-10 PROCEDURE — 36415 COLL VENOUS BLD VENIPUNCTURE: CPT

## 2024-12-10 RX ORDER — EMPAGLIFLOZIN 10 MG/1
1 TABLET, FILM COATED ORAL DAILY
COMMUNITY
Start: 2024-10-11

## 2024-12-10 RX ORDER — METFORMIN HYDROCHLORIDE 500 MG/1
1000 TABLET, EXTENDED RELEASE ORAL
Qty: 180 TABLET | Refills: 3 | Status: SHIPPED | OUTPATIENT
Start: 2024-12-10

## 2024-12-10 RX ORDER — HYDROCODONE BITARTRATE AND ACETAMINOPHEN 5; 325 MG/1; MG/1
1 TABLET ORAL EVERY 8 HOURS PRN
Qty: 30 TABLET | Refills: 0 | Status: SHIPPED | OUTPATIENT
Start: 2024-12-10

## 2024-12-10 RX ORDER — SPIRONOLACTONE 25 MG/1
12.5 TABLET ORAL DAILY
COMMUNITY

## 2025-05-05 NOTE — PROGRESS NOTES
Subjective   Seema Silveira is a 79 y.o. female.       HPI   Pt is here today for hospital follow up from [place], [place].  While in [place] in January 2025 she fell and broke her right upper arm and shoulder.  This required surgery.  She also spent 6 weeks in a rehab facility in [place].   We have requested these records today but they are still pending at the time of her office visit.      She was seen by Dr. Trae Hines (Ortho Surgery).    Scheduled to see the surgeon again in July but was told she could find someone here since she is back home.      Dr. Betzaida Milton is her PCP in [place].      She has limited ROM in the right arm/shoulder.  Was told by Ortho that she needed to continue in home therapy when she returned home.  She still is not cleared for driving.          The following portions of the patient's history were reviewed and updated as appropriate: allergies, current medications, past family history, past medical history, past social history, past surgical history, and problem list.    Review of Systems   Constitutional:  Negative for chills, fatigue and fever.   Respiratory:  Negative for cough and shortness of breath.    Cardiovascular:  Negative for chest pain and palpitations.   Musculoskeletal:  Positive for arthralgias.   Neurological:  Negative for dizziness, weakness, numbness and headache.   Psychiatric/Behavioral:  Negative for depressed mood. The patient is not nervous/anxious.        Objective   Physical Exam  Vitals reviewed.   Constitutional:       General: She is not in acute distress.     Appearance: Normal appearance. She is obese.   Cardiovascular:      Rate and Rhythm: Normal rate and regular rhythm.      Pulses: Normal pulses.   Pulmonary:      Effort: Pulmonary effort is normal. No respiratory distress.   Musculoskeletal:      Right shoulder: Tenderness present. No swelling. Decreased range of motion. Decreased strength.      Right upper arm: Tenderness present. No  swelling.   Skin:     General: Skin is warm and dry.   Neurological:      Mental Status: She is alert and oriented to person, place, and time. Mental status is at baseline.   Psychiatric:         Mood and Affect: Mood normal.                  Procedures   Assessment & Plan   Diagnoses and all orders for this visit:    1. Closed fracture of proximal end of right humerus with delayed healing, unspecified fracture morphology, subsequent encounter (Primary)  Comments:  Records from [place] requested.   Referral given for PT/OT home health.   Referral to Ortho.  Orders:  -     Ambulatory Referral to Home Health  -     Ambulatory Referral to Orthopedic Surgery    2. S/P arthroscopy of right shoulder  Comments:  Records from [place] requested.   Referral given for PT/OT home health.   Referral to Ortho.

## 2025-05-06 ENCOUNTER — OFFICE VISIT (OUTPATIENT)
Dept: FAMILY MEDICINE CLINIC | Facility: CLINIC | Age: 80
End: 2025-05-06
Payer: MEDICARE

## 2025-05-06 VITALS
WEIGHT: 224 LBS | OXYGEN SATURATION: 100 % | BODY MASS INDEX: 41.22 KG/M2 | DIASTOLIC BLOOD PRESSURE: 77 MMHG | HEART RATE: 65 BPM | SYSTOLIC BLOOD PRESSURE: 124 MMHG | HEIGHT: 62 IN

## 2025-05-06 DIAGNOSIS — Z98.890 S/P ARTHROSCOPY OF RIGHT SHOULDER: ICD-10-CM

## 2025-05-06 DIAGNOSIS — S42.201G CLOSED FRACTURE OF PROXIMAL END OF RIGHT HUMERUS WITH DELAYED HEALING, UNSPECIFIED FRACTURE MORPHOLOGY, SUBSEQUENT ENCOUNTER: Primary | ICD-10-CM

## 2025-05-12 ENCOUNTER — TELEPHONE (OUTPATIENT)
Dept: FAMILY MEDICINE CLINIC | Facility: CLINIC | Age: 80
End: 2025-05-12
Payer: MEDICARE

## 2025-05-12 NOTE — TELEPHONE ENCOUNTER
Payam, PT with [facility] called for a verbal ok to start PT 2 x a week for 2 weeks then 1 x a week for 2 weeks.   [phone]

## 2025-05-15 ENCOUNTER — TELEPHONE (OUTPATIENT)
Dept: FAMILY MEDICINE CLINIC | Facility: CLINIC | Age: 80
End: 2025-05-15
Payer: MEDICARE

## 2025-06-09 NOTE — PROGRESS NOTES
The ABCs of the Annual Wellness Visit  Subsequent Medicare Wellness Visit    Chief Complaint   Patient presents with    Medicare Wellness-subsequent      Subjective    History of Present Illness:  Seema Silveira is a 79 y.o. female who presents for a Subsequent Medicare Wellness Visit.  MMSE score today is 30/30.   Immunizations reviewed.   Mammogram April 2024; unable to update right now due to  recent shoulder fracture and recovery.   DEXA scan Aug 2023; has paperwork to schedule just needs to schedule.   Colon screening - Cologuard June 2022.  Refuses further screening.      Pt is also here today for 6 month follow up on chronic medical conditions.   1) T2DM - currently on metformin  mg 1 tab daily.  Gets an eye exam every 4 months with Jeny.  Denies any skin lesions or sores that are not healing; denies numbness/tinlging in feet.  Last A1C was 5.4% (Dec 2024).  Denies any polyuria or polydipsia.  Doesn't check BS at home.  Says PCP in [place] told her she didn't have diabetes anymore; wants to see about stopping medication.     2) HTN-  Currently on ramipril 10 mg daily; metoprolol XL 75  mg 1 daily; Imdur 30 mg daily.  Followed by cardiology at [facility] for hx of A Fib; also on Plavix 75 mg daily.   Denies any Cp; palpitations; SOA; dizziness; headache; trouble with vision. Seeing cardiology next week.   3) Hyperlipidemia - currently on atorvastatin 20 mg daily but she is only taking 1/2 tab daily due to muscle pain.  .   4) Chronic back pain - currently has Norco that she uses sparingly; manages mostly with ES Tylenol.   Last fill for qty 30 of [place] was in Dec 2024.   Pain has been stable.    5) COPD - currently on Anoro inhaler; albuterol PRN.  Denies any current SOA, cough or wheezes.   6) Has completed PT for shoulder fracture; has 2 weeks left on OT.  Has not scheduled with Ortho here and has not had any further follow up with Ortho Surgery in [place].  .    The following portions of  the patient's history were reviewed and   updated as appropriate: allergies, current medications, past family history, past medical history, past social history, past surgical history, and problem list.    Compared to one year ago, the patient feels her physical   health is worse.    Compared to one year ago, the patient feels her mental   health is the same.    Recent Hospitalizations:  She was admitted within the past 365 days at [facility].       Current Medical Providers:  Patient Care Team:  Bridgette Cabrera APRN as PCP - General (Nurse Practitioner)    Outpatient Medications Prior to Visit   Medication Sig Dispense Refill    atorvastatin (LIPITOR) 20 MG tablet TAKE 1 TABLET BY MOUTH EVERY DAY 90 tablet 3    Blood Glucose Monitoring Suppl (FREESTYLE FREEDOM LITE) w/Device kit FREESTYLE FREEDOM LITE w/Device KIT      Calcium Carb-Cholecalciferol (CALCIUM-VITAMIN D) 600-400 MG-UNIT tablet Take 2 tablets by mouth Daily. 180 tablet 1    Calcium Carbonate 1500 (600 Ca) MG tablet Take 1 tablet by mouth Daily.      cholecalciferol (Cholecalciferol) 25 MCG (1000 UT) tablet Take 1 tablet by mouth Daily.      clopidogrel (PLAVIX) 75 MG tablet Take 75 mg by mouth Daily.  0    dorzolamide-timolol (COSOPT) 22.3-6.8 MG/ML ophthalmic solution INSTILL 1 DROP INTO BOTH EYES TWICE A DAY      DULoxetine (CYMBALTA) 30 MG capsule Take 1 capsule by mouth Daily.      furosemide (LASIX) 40 MG tablet       glucose blood (FREESTYLE LITE) test strip Use to test blood sugar once daily E11.65 100 each 12    HYDROcodone-acetaminophen (Norco) 5-325 MG per tablet Take 1 tablet by mouth Every 8 (Eight) Hours As Needed for Moderate Pain or Severe Pain. 30 tablet 0    hydrocortisone 2.5 % ointment       hydroxychloroquine (PLAQUENIL) 200 MG tablet Take 400 mg by mouth Daily.      isosorbide mononitrate (IMDUR) 30 MG 24 hr tablet Take 30 mg by mouth Daily.      Jardiance 10 MG tablet tablet Take 1 tablet by mouth Daily.      Lancets  (FREESTYLE) lancets FREESTYLE LANCETS      metFORMIN ER (GLUCOPHAGE-XR) 500 MG 24 hr tablet Take 2 tablets by mouth Daily With Breakfast. 180 tablet 3    metoprolol succinate XL (TOPROL-XL) 50 MG 24 hr tablet Take 1.5 tablets by mouth Daily.      moxifloxacin (VIGAMOX) 0.5 % ophthalmic solution INSTILL 1 DROP INTO RIGHT EYE 3 TIMES A DAY      multivitamin with minerals (Multi For Her 50+) tablet tablet Take 1 tablet by mouth Daily.      nitroglycerin (NITROSTAT) 0.4 MG SL tablet       ramipril (ALTACE) 10 MG capsule Take 10 mg by mouth Daily.      spironolactone (ALDACTONE) 25 MG tablet Take 0.5 tablets by mouth Daily.      Umeclidinium-Vilanterol (Anoro Ellipta) 62.5-25 MCG/ACT aerosol powder  inhaler Inhale 1 puff Daily. 1 each 11    Vitamin E 400 units tablet Take 1 tablet by mouth Daily.      VYZULTA 0.024 % solution Administer 1 drop to both eyes every night at bedtime.  1     No facility-administered medications prior to visit.       Opioid medication/s are on active medication list.  and I have evaluated her active treatment plan and pain score trends (see table).  Vitals:    06/11/25 1019   PainSc: 4    PainLoc: Arm     I have reviewed the chart for potential of high risk medication and harmful drug interactions in the elderly.          Aspirin is not on active medication list.  Aspirin use is not indicated based on review of current medical condition/s. Risk of harm outweighs potential benefits.  .    Patient Active Problem List   Diagnosis    Atherosclerosis of native arteries of extremities with intermittent claudication, bilateral legs    Chronic coronary artery disease    Chronic low back pain    Chronic obstructive pulmonary disease    Congestive heart failure    Constipation, chronic    Degeneration of intervertebral disc of lumbar region    Elevated antinuclear antibody (RENAE) level    Elevation of level of transaminase or lactic acid dehydrogenase (LDH)    Encounter for general adult medical  examination without abnormal findings    Visit for screening mammogram    Gastroesophageal reflux disease    Glaucoma    Hyperlipidemia    Hypertension    Lipoma    Lumbar radiculopathy, right    Muscle ache    Need for other prophylactic vaccination and inoculation against single diseases    Obesity, morbid    Inflammatory arthritis    Osteoarthritis    Osteopenia    Pain in joint of left shoulder    Paroxysmal atrial fibrillation    Postmenopausal    Presence of cardiac pacemaker    Restless leg syndrome    Sacroiliitis, not elsewhere classified    Sick sinus syndrome    Sleep apnea    Spondylosis of lumbosacral region    Status post percutaneous transluminal coronary angioplasty    Trochanteric bursitis of left hip    Type 2 diabetes mellitus     Advance Care Planning  Advance Directive is on file.  ACP discussion was held with the patient during this visit. Patient has an advance directive in EMR which is still valid.     Review of Systems   Constitutional:  Negative for chills, fatigue and fever.   Eyes:  Negative for visual disturbance.   Respiratory:  Negative for cough, shortness of breath and wheezing.    Cardiovascular:  Negative for chest pain and palpitations.   Gastrointestinal:  Negative for abdominal pain, blood in stool, constipation, diarrhea, nausea and vomiting.   Endocrine: Negative for polydipsia, polyphagia and polyuria.   Genitourinary:  Negative for dysuria, flank pain, frequency, hematuria and urgency.   Musculoskeletal:  Positive for arthralgias. Negative for joint swelling and myalgias.   Neurological:  Negative for dizziness, weakness and light-headedness.   Psychiatric/Behavioral:  Negative for dysphoric mood. The patient is not nervous/anxious.         Objective    Vitals:    06/11/25 1019   BP: 119/73   BP Location: Left arm   Patient Position: Sitting   Cuff Size: Large Adult   Pulse: 67   Temp: 97.5 °F (36.4 °C)   TempSrc: Temporal   SpO2: 98%   Weight: 101 kg (223 lb)   Height:  "157.5 cm (62\")   PainSc: 4    PainLoc: Arm     Estimated body mass index is 40.79 kg/m² as calculated from the following:    Height as of this encounter: 157.5 cm (62\").    Weight as of this encounter: 101 kg (223 lb).           Does the patient have evidence of cognitive impairment? No    Physical Exam  Vitals reviewed.   Constitutional:       General: She is not in acute distress.     Appearance: Normal appearance. She is obese.   HENT:      Head: Normocephalic and atraumatic.   Cardiovascular:      Rate and Rhythm: Normal rate and regular rhythm.      Pulses: Normal pulses.      Heart sounds: Normal heart sounds.   Pulmonary:      Effort: No respiratory distress.      Breath sounds: Normal breath sounds. No wheezing, rhonchi or rales.   Chest:      Chest wall: No tenderness.   Abdominal:      Tenderness: There is no right CVA tenderness or left CVA tenderness.   Musculoskeletal:      Cervical back: Normal range of motion and neck supple.   Skin:     Findings: Bruising (on forearms) present.   Neurological:      General: No focal deficit present.      Mental Status: She is alert and oriented to person, place, and time.   Psychiatric:         Mood and Affect: Mood normal.                 HEALTH RISK ASSESSMENT    Smoking Status:  Social History     Tobacco Use   Smoking Status Never   Smokeless Tobacco Never     Alcohol Consumption:  Social History     Substance and Sexual Activity   Alcohol Use No     Fall Risk Screen:    STEADI Fall Risk Assessment was completed, and patient is at HIGH risk for falls. Assessment completed on:2025    Depression Screenin/11/2025    10:23 AM   PHQ-2/PHQ-9 Depression Screening   Little interest or pleasure in doing things Not at all   Feeling down, depressed, or hopeless Not at all   How difficult have these problems made it for you to do your work, take care of things at home, or get along with other people? Not difficult at all       Health Habits and Functional and " Cognitive Screenin/11/2025    10:21 AM   Functional & Cognitive Status   Do you have difficulty preparing food and eating? No   Do you have difficulty bathing yourself, getting dressed or grooming yourself? No   Do you have difficulty using the toilet? No   Do you have difficulty moving around from place to place? No   Do you have trouble with steps or getting out of a bed or a chair? Yes   Current Diet Well Balanced Diet   Dental Exam Not up to date   Eye Exam Up to date   Exercise (times per week) 2 times per week   Current Exercises Include --        Exercise Comment PT   Do you need help using the phone?  No   Are you deaf or do you have serious difficulty hearing?  Yes   Do you need help to go to places out of walking distance? Yes   Do you need help shopping? No   Do you need help preparing meals?  No   Do you need help with housework?  Yes   Do you need help with laundry? No   Do you need help taking your medications? No   Do you need help managing money? No   Do you ever drive or ride in a car without wearing a seat belt? No   Have you felt unusual stress, anger or loneliness in the last month? No   Who do you live with? Child   If you need help, do you have trouble finding someone available to you? No   Have you been bothered in the last four weeks by sexual problems? No   Do you have difficulty concentrating, remembering or making decisions? Yes       Age-appropriate Screening Schedule:  Refer to the list below for future screening recommendations based on patient's age, sex and/or medical conditions. Orders for these recommended tests are listed in the plan section. The patient has been provided with a written plan.    Health Maintenance   Topic Date Due    DIABETIC FOOT EXAM  Never done    URINE MICROALBUMIN-CREATININE RATIO (uACR)  2024    COVID-19 Vaccine ( season) 2024    HEMOGLOBIN A1C  06/10/2025    DXA SCAN  2025    ZOSTER VACCINE (1 of 2) 2025  (Originally 9/30/1995)    INFLUENZA VACCINE  07/01/2025    DIABETIC EYE EXAM  10/07/2025    LIPID PANEL  12/10/2025    ANNUAL WELLNESS VISIT  06/11/2026    TDAP/TD VACCINES (2 - Tdap) 07/19/2032    HEPATITIS C SCREENING  Completed    RSV Vaccine - Adults  Completed    Pneumococcal Vaccine 50+  Completed    MAMMOGRAM  Discontinued    COLORECTAL CANCER SCREENING  Discontinued              Assessment & Plan   CMS Preventative Services Quick Reference  Risk Factors Identified During Encounter  Fall Risk-High or Moderate: Discussed Fall Prevention in the home  Immunizations Discussed/Encouraged: Shingrix  Inactivity/Sedentary: Patient was advised to exercise at least 150 minutes a week per CDC recommendations. and Patient was advised to do at least 150 minutes a week of moderate intensity activity such as brisk walking and do at least 2 days a week of activities that strengthen muscles such as resistance training and do activities to improve balance such as standing on one foot about 3 days a week.  The above risks/problems have been discussed with the patient.  Follow up actions/plans if indicated are seen below in the Assessment/Plan Section.  Pertinent information has been shared with the patient in the After Visit Summary.    Diagnoses and all orders for this visit:    1. Medicare annual wellness visit, subsequent (Primary)  Comments:  Medical/social/family hx reviewed.   Refused colon screening today.   Waiting on mammogram until shoulder healed.   DEXA needs to be scheduled.    2. Essential hypertension  Comments:  Stable.   Cont. current medication.   Labs ordered.   Keep f/u with cardiology tomorrow.  Orders:  -     Comprehensive metabolic panel; Future  -     Lipid panel; Future  -     Hemoglobin A1c; Future  -     Microalbumin / Creatinine Urine Ratio - Urine, Clean Catch; Future    3. Mixed hyperlipidemia  Comments:  Stable.   Cont. current medication.   Labs ordered.   RTO in 6 mo.  Orders:  -      Comprehensive metabolic panel; Future  -     Lipid panel; Future  -     Hemoglobin A1c; Future  -     Microalbumin / Creatinine Urine Ratio - Urine, Clean Catch; Future    4. Type 2 diabetes mellitus with hyperglycemia, without long-term current use of insulin  Comments:  Will get labs today; would like to evaluate A1C before any med changes.   Labs ordered.   Work on healthy diet; aim for 150 min exercise weekly.  Orders:  -     Comprehensive metabolic panel; Future  -     Lipid panel; Future  -     Hemoglobin A1c; Future  -     Microalbumin / Creatinine Urine Ratio - Urine, Clean Catch; Future    5. Chronic low back pain with bilateral sciatica, unspecified back pain laterality  Comments:  Stable.   No refill needed at this time.   Cont. current medication as needed.    6. Obstructive chronic bronchitis without exacerbation  Comments:  Stable.   Cont. current medication.   RTO in 6 mo.        Follow Up:   Return in about 6 months (around 12/11/2025).     An After Visit Summary and PPPS were made available to the patient.

## 2025-06-11 ENCOUNTER — OFFICE VISIT (OUTPATIENT)
Dept: FAMILY MEDICINE CLINIC | Facility: CLINIC | Age: 80
End: 2025-06-11
Payer: MEDICARE

## 2025-06-11 ENCOUNTER — LAB (OUTPATIENT)
Dept: FAMILY MEDICINE CLINIC | Facility: CLINIC | Age: 80
End: 2025-06-11
Payer: MEDICARE

## 2025-06-11 VITALS
BODY MASS INDEX: 41.04 KG/M2 | TEMPERATURE: 97.5 F | SYSTOLIC BLOOD PRESSURE: 119 MMHG | DIASTOLIC BLOOD PRESSURE: 73 MMHG | OXYGEN SATURATION: 98 % | HEART RATE: 67 BPM | HEIGHT: 62 IN | WEIGHT: 223 LBS

## 2025-06-11 DIAGNOSIS — E11.65 TYPE 2 DIABETES MELLITUS WITH HYPERGLYCEMIA, WITHOUT LONG-TERM CURRENT USE OF INSULIN: ICD-10-CM

## 2025-06-11 DIAGNOSIS — M54.41 CHRONIC LOW BACK PAIN WITH BILATERAL SCIATICA, UNSPECIFIED BACK PAIN LATERALITY: ICD-10-CM

## 2025-06-11 DIAGNOSIS — E78.2 MIXED HYPERLIPIDEMIA: ICD-10-CM

## 2025-06-11 DIAGNOSIS — J44.89 OBSTRUCTIVE CHRONIC BRONCHITIS WITHOUT EXACERBATION: ICD-10-CM

## 2025-06-11 DIAGNOSIS — I10 ESSENTIAL HYPERTENSION: ICD-10-CM

## 2025-06-11 DIAGNOSIS — Z00.00 MEDICARE ANNUAL WELLNESS VISIT, SUBSEQUENT: Primary | ICD-10-CM

## 2025-06-11 DIAGNOSIS — M54.42 CHRONIC LOW BACK PAIN WITH BILATERAL SCIATICA, UNSPECIFIED BACK PAIN LATERALITY: ICD-10-CM

## 2025-06-11 DIAGNOSIS — G89.29 CHRONIC LOW BACK PAIN WITH BILATERAL SCIATICA, UNSPECIFIED BACK PAIN LATERALITY: ICD-10-CM

## 2025-06-11 LAB
ALBUMIN SERPL-MCNC: 4.2 G/DL (ref 3.5–5.2)
ALBUMIN UR-MCNC: <1.2 MG/DL
ALBUMIN/GLOB SERPL: 1.5 G/DL
ALP SERPL-CCNC: 60 U/L (ref 39–117)
ALT SERPL W P-5'-P-CCNC: 17 U/L (ref 1–33)
ANION GAP SERPL CALCULATED.3IONS-SCNC: 11.7 MMOL/L (ref 5–15)
AST SERPL-CCNC: 22 U/L (ref 1–32)
BILIRUB SERPL-MCNC: 0.6 MG/DL (ref 0–1.2)
BUN SERPL-MCNC: 21 MG/DL (ref 8–23)
BUN/CREAT SERPL: 26.6 (ref 7–25)
CALCIUM SPEC-SCNC: 9.9 MG/DL (ref 8.6–10.5)
CHLORIDE SERPL-SCNC: 108 MMOL/L (ref 98–107)
CHOLEST SERPL-MCNC: 129 MG/DL (ref 0–200)
CO2 SERPL-SCNC: 22.3 MMOL/L (ref 22–29)
CREAT SERPL-MCNC: 0.79 MG/DL (ref 0.57–1)
CREAT UR-MCNC: 58.4 MG/DL
EGFRCR SERPLBLD CKD-EPI 2021: 77.2 ML/MIN/1.73
GLOBULIN UR ELPH-MCNC: 2.8 GM/DL
GLUCOSE SERPL-MCNC: 95 MG/DL (ref 65–99)
HBA1C MFR BLD: 5.3 % (ref 4.8–5.6)
HDLC SERPL-MCNC: 56 MG/DL (ref 40–60)
LDLC SERPL CALC-MCNC: 49 MG/DL (ref 0–100)
LDLC/HDLC SERPL: 0.81 {RATIO}
MICROALBUMIN/CREAT UR: NORMAL MG/G{CREAT}
POTASSIUM SERPL-SCNC: 4.3 MMOL/L (ref 3.5–5.2)
PROT SERPL-MCNC: 7 G/DL (ref 6–8.5)
SODIUM SERPL-SCNC: 142 MMOL/L (ref 136–145)
TRIGL SERPL-MCNC: 137 MG/DL (ref 0–150)
VLDLC SERPL-MCNC: 24 MG/DL (ref 5–40)

## 2025-06-11 PROCEDURE — 80053 COMPREHEN METABOLIC PANEL: CPT | Performed by: NURSE PRACTITIONER

## 2025-06-11 PROCEDURE — 82043 UR ALBUMIN QUANTITATIVE: CPT | Performed by: NURSE PRACTITIONER

## 2025-06-11 PROCEDURE — 36415 COLL VENOUS BLD VENIPUNCTURE: CPT

## 2025-06-11 PROCEDURE — 80061 LIPID PANEL: CPT | Performed by: NURSE PRACTITIONER

## 2025-06-11 PROCEDURE — 83036 HEMOGLOBIN GLYCOSYLATED A1C: CPT | Performed by: NURSE PRACTITIONER

## 2025-06-11 PROCEDURE — 82570 ASSAY OF URINE CREATININE: CPT | Performed by: NURSE PRACTITIONER

## 2025-06-16 ENCOUNTER — OFFICE VISIT (OUTPATIENT)
Dept: ORTHOPEDIC SURGERY | Facility: CLINIC | Age: 80
End: 2025-06-16
Payer: COMMERCIAL

## 2025-06-16 VITALS — HEIGHT: 62 IN | BODY MASS INDEX: 41.04 KG/M2 | WEIGHT: 223 LBS | HEART RATE: 72 BPM

## 2025-06-16 DIAGNOSIS — M25.511 ACUTE PAIN OF RIGHT SHOULDER: Primary | ICD-10-CM

## 2025-06-16 NOTE — PROGRESS NOTES
"     Patient ID: Seema Silveira is a 79 y.o. female.    Chief Complaint:    Chief Complaint   Patient presents with    Right Shoulder - Pain, Initial Evaluation     Pain 3-4        HPI:  This is a 79-year-old female here with left shoulder pain she had reverse total shoulder arthroplasty for what sounds like a displaced fracture about 6 months ago.  She states she was in rehab for quite a while afterwards where they did not really work with her shoulder.  She is now working with a therapist here in town.  She relates pain is much improved but she is not really making any functional gains.  Past Medical History:   Diagnosis Date    Inflammatory arthritis        Past Surgical History:   Procedure Laterality Date    APPENDECTOMY      BREAST EXCISIONAL BIOPSY Left 1996    no scar noted    CHOLECYSTECTOMY      EYE SURGERY  05/17/2022    right eye in Jan 2023    HYSTERECTOMY         Family History   Problem Relation Age of Onset    Diabetes Mother     Glaucoma Mother     No Known Problems Father           Social History     Occupational History    Not on file   Tobacco Use    Smoking status: Never    Smokeless tobacco: Never   Vaping Use    Vaping status: Never Used   Substance and Sexual Activity    Alcohol use: No    Drug use: No    Sexual activity: Not Currently      Review of Systems   Cardiovascular:  Negative for chest pain.   Musculoskeletal:  Positive for arthralgias.       Objective:    Pulse 72   Ht 157.5 cm (62\")   Wt 101 kg (223 lb)   BMI 40.79 kg/m²     Physical Examination:    Right shoulder has a healed incision over the front of the shoulder no sign of infection no tenderness passive elevation 150 external rotation 30 but active elevation is only around 20 degrees  Imaging:  right Shoulder X-Ray  Indication: Postop total shoulder fracture  AP Y and Lateral views  Findings: Reverse total shoulder in position without complication  no bony lesion  Soft tissues normal  normal joint spaces  Hardware " appropriately positioned yes      no prior studies available for comparison.    Assessment:  Weakness after shoulder arthroplasty    Plan:    Discussed the findings on x-ray that are concerning for lack of any tuberosity healing and how that can limit function.  She is going to continue shoulder exercises and transition to a home program in the next month or 2.  She has no restrictions in my opinion.  She can see me with x-ray in 6 months    Procedures         Disclaimer: Part of this note may be an electronic transcription/translation of spoken language to printed text using the Dragon Dictation System

## 2025-06-17 ENCOUNTER — PATIENT ROUNDING (BHMG ONLY) (OUTPATIENT)
Dept: ORTHOPEDIC SURGERY | Facility: CLINIC | Age: 80
End: 2025-06-17
Payer: MEDICARE

## 2025-06-18 ENCOUNTER — TELEPHONE (OUTPATIENT)
Dept: FAMILY MEDICINE CLINIC | Facility: CLINIC | Age: 80
End: 2025-06-18

## 2025-06-18 NOTE — TELEPHONE ENCOUNTER
Caller: [facility]    Relationship: Home Health    Best call back number: [phone]    What is the best time to reach you: ANY    Who are you requesting to speak with (clinical staff, provider,  specific staff member): CLINICAL    Do you know the name of the person who called: ELIZABETH    What was the call regarding: ORDER # [id number]-PLAN OF CARE FOR PATIENT SIGNATURE IS NEEDED  FAX NUMBER [phone]

## 2025-06-20 ENCOUNTER — TELEPHONE (OUTPATIENT)
Dept: ORTHOPEDIC SURGERY | Facility: CLINIC | Age: 80
End: 2025-06-20
Payer: MEDICARE

## 2025-06-20 DIAGNOSIS — M25.511 ACUTE PAIN OF RIGHT SHOULDER: Primary | ICD-10-CM

## 2025-06-20 NOTE — TELEPHONE ENCOUNTER
Allyson with [facility] called getting ready for discharge and for home health. Not sure if orders are set up for home health. Will be discharged Tuesday June 24, 2025. She would like [facility] in [place].  Address [address], [address] [address]    TELEPHONE  [phone]

## 2025-07-29 ENCOUNTER — TELEPHONE (OUTPATIENT)
Dept: FAMILY MEDICINE CLINIC | Facility: CLINIC | Age: 80
End: 2025-07-29
Payer: MEDICARE

## 2025-07-29 NOTE — TELEPHONE ENCOUNTER
Caller: ELIZABETH  [facility]    Relationship to patient:     Best call back number:     [phone]    EXT [phone]    Patient is needing: ELIZABETH IS CALLING TO SEE IF THE OFFICE CAN SIGN THE PT ORDER THAT WAS FAXED INTO THE OFFICE ON 5/12. SHE STATES THEY GOT THE VERBAL BECAUSE OF PATIENTS INSURANCE THEY ALSO NEED A  SIGNATURE FOR THE PT ORDER. PLEASE CALL TO UPDATE ELIZABETH WITH STATUS

## 2025-08-22 ENCOUNTER — HOSPITAL ENCOUNTER (EMERGENCY)
Facility: HOSPITAL | Age: 80
Discharge: HOME | End: 2025-08-22
Attending: EMERGENCY MEDICINE
Payer: MEDICARE

## 2025-08-22 ENCOUNTER — APPOINTMENT (OUTPATIENT)
Dept: CARDIOLOGY | Facility: HOSPITAL | Age: 80
End: 2025-08-22
Payer: MEDICARE